# Patient Record
Sex: FEMALE | Race: WHITE
[De-identification: names, ages, dates, MRNs, and addresses within clinical notes are randomized per-mention and may not be internally consistent; named-entity substitution may affect disease eponyms.]

---

## 2017-01-09 ENCOUNTER — HOSPITAL ENCOUNTER (OUTPATIENT)
Dept: HOSPITAL 45 - C.LAB1850 | Age: 82
Discharge: HOME | End: 2017-01-09
Attending: INTERNAL MEDICINE
Payer: COMMERCIAL

## 2017-01-09 DIAGNOSIS — N18.3: ICD-10-CM

## 2017-01-09 DIAGNOSIS — E78.00: Primary | ICD-10-CM

## 2017-01-09 LAB
ALP SERPL-CCNC: 55 U/L (ref 45–117)
ALT SERPL-CCNC: 22 U/L (ref 12–78)
ANION GAP SERPL CALC-SCNC: 8 MMOL/L (ref 3–11)
APPEARANCE UR: CLEAR
AST SERPL-CCNC: 15 U/L (ref 15–37)
BILIRUB UR-MCNC: (no result) MG/DL
BUN SERPL-MCNC: 12 MG/DL (ref 7–18)
BUN/CREAT SERPL: 11.1 (ref 10–20)
CALCIUM SERPL-MCNC: 9 MG/DL (ref 8.5–10.1)
CHLORIDE SERPL-SCNC: 105 MMOL/L (ref 98–107)
CHOLEST/HDLC SERPL: 1.7 {RATIO}
CO2 SERPL-SCNC: 26 MMOL/L (ref 21–32)
COLOR UR: YELLOW
CREAT SERPL-MCNC: 1.1 MG/DL (ref 0.6–1.2)
EOSINOPHIL NFR BLD AUTO: 316 K/UL (ref 130–400)
GLUCOSE SERPL-MCNC: 90 MG/DL (ref 70–99)
GLUCOSE UR QL: 127 MG/DL
HCT VFR BLD CALC: 39.5 % (ref 37–47)
KETONES UR QL STRIP: 63 MG/DL
MANUAL MICROSCOPIC REQUIRED?: NO
MCH RBC QN AUTO: 30.7 PG (ref 25–34)
MCHC RBC AUTO-ENTMCNC: 34.7 G/DL (ref 32–36)
MCV RBC AUTO: 88.6 FL (ref 80–100)
NITRITE UR QL STRIP: (no result)
NITRITE UR QL STRIP: 154 MG/DL (ref 0–150)
PH UR STRIP: 5 [PH] (ref 4.5–7.5)
PH UR: 221 MG/DL (ref 0–200)
PHOSPHATE SERPL-MCNC: 3.8 MG/DL (ref 2.5–4.9)
PMV BLD AUTO: 9.8 FL (ref 7.4–10.4)
POTASSIUM SERPL-SCNC: 4.5 MMOL/L (ref 3.5–5.1)
RBC # BLD AUTO: 4.46 M/UL (ref 4.2–5.4)
REVIEW REQ?: NO
SODIUM SERPL-SCNC: 139 MMOL/L (ref 136–145)
SP GR UR STRIP: 1.01 (ref 1–1.03)
URINE BILL WITH OR WITHOUT MIC: (no result)
UROBILINOGEN UR-MCNC: (no result) MG/DL
VERY LOW DENSITY LIPOPROT CALC: 31 MG/DL
WBC # BLD AUTO: 7.89 K/UL (ref 4.8–10.8)

## 2017-03-09 ENCOUNTER — HOSPITAL ENCOUNTER (OUTPATIENT)
Dept: HOSPITAL 45 - C.MAMM | Age: 82
Discharge: HOME | End: 2017-03-09
Attending: FAMILY MEDICINE
Payer: COMMERCIAL

## 2017-03-09 DIAGNOSIS — Z12.31: Primary | ICD-10-CM

## 2017-03-09 NOTE — MAMMOGRAPHY REPORT
UNILATERAL LEFT DIGITAL SCREENING MAMMOGRAM TOMOSYNTHESIS WITH CAD: 3/9/2017

CLINICAL HISTORY: Routine screening.  Patient has no complaints.  





TECHNIQUE:  Breast tomosynthesis in addition to standard 2D mammography was performed. Current study
 was also evaluated with a Computer Aided Detection (CAD) system.  Left CC and MLO 2-D and tomosynth
esis images were obtained.



COMPARISON: Comparison is made to exams dated:  3/7/2016 mammogram, 3/4/2015 mammogram, 3/10/2015 ma
mmogram, 3/3/2014 mammogram, 3/1/2013 mammogram, and 2/28/2012 mammogram - [facility].   



BREAST COMPOSITION:  There are scattered areas of fibroglandular density in the left breast.  



FINDINGS:  No suspicious masses, calcifications, or areas of architectural distortion are noted in t
he left breast.  There has been no significant interval change compared to prior exams.  Scattered b
enign-appearing left breast calcifications are not significantly changed.





IMPRESSION:  ACR BI-RADS CATEGORY 2: BENIGN

There is no mammographic evidence of malignancy. A 1 year screening mammogram is recommended.  The p
atient will receive written notification of the results.  





Approximately 10% of breast cancers are not detected with mammography. A negative mammographic repor
t should not delay biopsy if a clinically suggestive mass is present.



Renetta Denson M.D.          

/:3/9/2017 12:39:33  



Imaging Technologist: Nat VILLARREAL)(RYLAN), [facility]

letter sent: Normal 1/2  

BI-RADS Code: ACR BI-RADS Category 2: Benign

## 2017-04-09 ENCOUNTER — HOSPITAL ENCOUNTER (EMERGENCY)
Dept: HOSPITAL 45 - C.EDB | Age: 82
Discharge: HOME | End: 2017-04-09
Payer: COMMERCIAL

## 2017-04-09 VITALS — HEART RATE: 68 BPM | OXYGEN SATURATION: 97 % | DIASTOLIC BLOOD PRESSURE: 77 MMHG | SYSTOLIC BLOOD PRESSURE: 180 MMHG

## 2017-04-09 VITALS
BODY MASS INDEX: 23.02 KG/M2 | BODY MASS INDEX: 23.02 KG/M2 | WEIGHT: 114.2 LBS | HEIGHT: 59.02 IN | WEIGHT: 114.2 LBS | HEIGHT: 59.02 IN

## 2017-04-09 VITALS — TEMPERATURE: 97.34 F

## 2017-04-09 DIAGNOSIS — L55.9: Primary | ICD-10-CM

## 2017-04-09 DIAGNOSIS — Z82.49: ICD-10-CM

## 2017-04-09 DIAGNOSIS — Z83.3: ICD-10-CM

## 2017-04-09 DIAGNOSIS — I10: ICD-10-CM

## 2017-04-09 DIAGNOSIS — I48.91: ICD-10-CM

## 2017-04-09 DIAGNOSIS — Z79.82: ICD-10-CM

## 2017-04-09 DIAGNOSIS — S69.91XA: ICD-10-CM

## 2017-04-09 DIAGNOSIS — E78.5: ICD-10-CM
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
## 2017-04-09 LAB
ALP SERPL-CCNC: 55 U/L (ref 45–117)
ALT SERPL-CCNC: 21 U/L (ref 12–78)
ANION GAP SERPL CALC-SCNC: 10 MMOL/L (ref 3–11)
AST SERPL-CCNC: 13 U/L (ref 15–37)
BASOPHILS # BLD: 0.01 K/UL (ref 0–0.2)
BASOPHILS NFR BLD: 0.1 %
BUN SERPL-MCNC: 20 MG/DL (ref 7–18)
BUN/CREAT SERPL: 16.4 (ref 10–20)
CALCIUM SERPL-MCNC: 8.7 MG/DL (ref 8.5–10.1)
CHLORIDE SERPL-SCNC: 110 MMOL/L (ref 98–107)
CO2 SERPL-SCNC: 24 MMOL/L (ref 21–32)
COMPLETE: YES
CREAT CL PREDICTED SERPL C-G-VRATE: 25.9 ML/MIN
CREAT SERPL-MCNC: 1.2 MG/DL (ref 0.6–1.2)
EOSINOPHIL NFR BLD AUTO: 278 K/UL (ref 130–400)
GLUCOSE SERPL-MCNC: 91 MG/DL (ref 70–99)
HCT VFR BLD CALC: 36 % (ref 37–47)
IG%: 0.1 %
IMM GRANULOCYTES NFR BLD AUTO: 44.4 %
INR PPP: 1.1 (ref 0.9–1.1)
LYMPHOCYTES # BLD: 3.14 K/UL (ref 1.2–3.4)
MCH RBC QN AUTO: 29.4 PG (ref 25–34)
MCHC RBC AUTO-ENTMCNC: 33.9 G/DL (ref 32–36)
MCV RBC AUTO: 86.7 FL (ref 80–100)
MONOCYTES NFR BLD: 5.9 %
NEUTROPHILS # BLD AUTO: 0.8 %
NEUTROPHILS NFR BLD AUTO: 48.7 %
PARTIAL THROMBOPLASTIN RATIO: 0.9
PMV BLD AUTO: 9.4 FL (ref 7.4–10.4)
POTASSIUM SERPL-SCNC: 4.4 MMOL/L (ref 3.5–5.1)
PROTHROMBIN TIME: 11.3 SECONDS (ref 9–12)
RBC # BLD AUTO: 4.15 M/UL (ref 4.2–5.4)
SODIUM SERPL-SCNC: 144 MMOL/L (ref 136–145)
WBC # BLD AUTO: 7.07 K/UL (ref 4.8–10.8)

## 2017-04-09 NOTE — EMERGENCY ROOM VISIT NOTE
History


Report prepared by Samantha:  Deon Rosario


Under the Supervision of:  Dr. Odell Sanchez M.D.


First contact with patient:  17:06


Chief Complaint:  SUN BURN


Stated Complaint:  SUN BURN HANDS AND NOSE





History of Present Illness


The patient is a 81 year old female who presents to the Emergency Room with 

concerns over a bruise on the back of her right hand that she noticed today. 

The patient states that she was digging weeds on Friday, two days prior to 

arrival when she may have bumped the hand to cause the bruise. She got very 

sunburnt then and was concerned that the bruise on the back of her right hand 

was something secondary to the sunburn. She denies any recent rectal bleeding, 

melena, nose bleeds, or headaches. She is on daily Baby Aspirin but otherwise 

is not on any anticoagulants.  She denies any easy bruising anywhere else.  She 

has had no chest pain or shortness of breath or any other concerning symptoms.





   Source of History:  patient


   Onset:  Today


   Position:  hand (right)


   Quality:  other (Bruise)


   Timing:  constant


   Associated Symptoms:  No headache





Review of Systems


See HPI for pertinent positives & negatives. A total of 10 systems reviewed and 

were otherwise negative.





Past Medical & Surgical


Medical Problems:


(1) Atrial Fibrillation


(2) Breast cancer


(3) Esophageal Reflux


(4) Hyperlipidemia Nec/Nos


(5) Hypertension


(6) Hypertension Nos








Family History





Cancer


Diabetes mellitus


Gallbladder disease


Hypertension


Lung disease





Social History


Smoking Status:  Never Smoker


Alcohol Use:  none


Marital Status:  


Housing Status:  lives with significant other


Occupation Status:  retired





Current/Historical Medications


Scheduled


Aspirin (Aspirin Ec), 81 MG PO DAILY


Chlorthalidone (Hygroton), 25 MG PO DAILY


Diltiazem Hcl Extended Release (Tiazac 360 Mg), 360 MG PO DAILY


Doxycycline (Monohydrate) (Monodox), 100 MG PO BID


Hydralazine Hcl (Apresoline), 25 MG PO TID


Lisinopril (Lisinopril), 40 MG PO DAILY


Simvastatin (Zocor), 20 MG PO Q2D





Allergies


Coded Allergies:  


     Penicillins (Verified  Allergy, Intermediate, swelling, 5/8/15)





Physical Exam


Vital Signs











  Date Time  Temp Pulse Resp B/P Pulse Ox O2 Delivery O2 Flow Rate FiO2


 


4/9/17 18:10  68 18 180/77 97   


 


4/9/17 16:54     98 Room Air  


 


4/9/17 16:37 36.3 63 16 173/77 98 Room Air  











Physical Exam


Constitutional: Vital signs reviewed.


Eyes: Pupils are equal round reactive to light.  Conjunctiva are noninjected.  


ENT: Pharynx is clear without erythema or exudate.  Mucous membranes are moist.

  Neck supple without meningeal signs.


Respiratory: Clear to auscultation bilaterally.  Breath sounds are equal 

bilaterally. 


Cardiovascular: Regular rate and rhythm.  No rubs or gallops.


GI: Soft, nondistended and nontender.  Bowel sounds are present.


Musculoskeletal: No lower extremity tenderness.  No bruising to the lower 

extremities.  There is a sunburn to the scalp, face, back of both hands with 

localized edema. There is a bruise to the dorsum of the right hand with no 

significant tenderness, no swelling to the right arm. 


Integumentary: See above. 


Neurological: The patient is awake and alert.  No focal deficits.


Psychiatric: Normal affect.





Medical Decision & Procedures


ER Provider


Diagnostic Interpretation:


Radiology results as stated below per my review and the radiologist's 

interpretation:





RIGHT HAND 3 VIEWS





HISTORY:      Right hand pain.





COMPARISON: None.





FINDINGS: There is no fracture or dislocation. Chondrocalcinosis within the


wrist. Moderate degenerative changes seen within the right hand and wrist. Soft


tissue swelling within the thumb and index finger. No radiopaque foreign bodies.





IMPRESSION:  





1. No definite fractures within the right hand.


2. Moderate degenerative changes and chondrocalcinosis.


3. Soft tissue swelling within the thumb and index finger.











Electronically signed by:  Ciro Rojo M.D.


4/9/2017 5:47 PM





Dictated Date/Time:  4/9/2017 5:43 PM





Laboratory Results


4/9/17 17:25








Red Blood Count 4.15, Mean Corpuscular Volume 86.7, Mean Corpuscular Hemoglobin 

29.4, Mean Corpuscular Hemoglobin Concent 33.9, Mean Platelet Volume 9.4, 

Neutrophils (%) (Auto) 48.7, Lymphocytes (%) (Auto) 44.4, Monocytes (%) (Auto) 

5.9, Eosinophils (%) (Auto) 0.8, Basophils (%) (Auto) 0.1, Neutrophils # (Auto) 

3.43, Lymphocytes # (Auto) 3.14, Monocytes # (Auto) 0.42, Eosinophils # (Auto) 

0.06, Basophils # (Auto) 0.01





4/9/17 17:25

















Test


  4/9/17


17:25


 


White Blood Count


  7.07 K/uL


(4.8-10.8)


 


Red Blood Count


  4.15 M/uL


(4.2-5.4)


 


Hemoglobin


  12.2 g/dL


(12.0-16.0)


 


Hematocrit 36.0 % (37-47) 


 


Mean Corpuscular Volume


  86.7 fL


()


 


Mean Corpuscular Hemoglobin


  29.4 pg


(25-34)


 


Mean Corpuscular Hemoglobin


Concent 33.9 g/dl


(32-36)


 


Platelet Count


  278 K/uL


(130-400)


 


Mean Platelet Volume


  9.4 fL


(7.4-10.4)


 


Neutrophils (%) (Auto) 48.7 % 


 


Lymphocytes (%) (Auto) 44.4 % 


 


Monocytes (%) (Auto) 5.9 % 


 


Eosinophils (%) (Auto) 0.8 % 


 


Basophils (%) (Auto) 0.1 % 


 


Neutrophils # (Auto)


  3.43 K/uL


(1.4-6.5)


 


Lymphocytes # (Auto)


  3.14 K/uL


(1.2-3.4)


 


Monocytes # (Auto)


  0.42 K/uL


(0.11-0.59)


 


Eosinophils # (Auto)


  0.06 K/uL


(0-0.5)


 


Basophils # (Auto)


  0.01 K/uL


(0-0.2)


 


RDW Standard Deviation


  42.9 fL


(36.4-46.3)


 


RDW Coefficient of Variation


  13.4 %


(11.5-14.5)


 


Immature Granulocyte % (Auto) 0.1 % 


 


Immature Granulocyte # (Auto)


  0.01 K/uL


(0.00-0.02)


 


Prothrombin Time


  11.3 SECONDS


(9.0-12.0)


 


Prothromb Time International


Ratio 1.1 (0.9-1.1) 


 


 


Activated Partial


Thromboplast Time 24.5 SECONDS


(21.0-31.0)


 


Partial Thromboplastin Ratio 0.9 


 


Anion Gap


  10.0 mmol/L


(3-11)


 


Est Creatinine Clear Calc


Drug Dose 25.9 ml/min 


 


 


Estimated GFR (


American) 49.8 


 


 


Estimated GFR (Non-


American 43.0 


 


 


BUN/Creatinine Ratio 16.4 (10-20) 


 


Calcium Level


  8.7 mg/dl


(8.5-10.1)


 


Total Bilirubin


  0.6 mg/dl


(0.2-1)


 


Direct Bilirubin


  0.2 mg/dl


(0-0.2)


 


Aspartate Amino Transf


(AST/SGOT) 13 U/L (15-37) 


 


 


Alanine Aminotransferase


(ALT/SGPT) 21 U/L (12-78) 


 


 


Alkaline Phosphatase


  55 U/L


()


 


Total Protein


  6.8 gm/dl


(6.4-8.2)


 


Albumin


  4.0 gm/dl


(3.4-5.0)





Laboratory results as reviewed by me.





ED Course


1709: The patient was evaluated in room D5. A complete history and physical 

exam was performed.





1801: Upon reevaluation, the patient was resting in bed. I discussed tonight's 

findings with her. She verbalized agreement of the treatment plan. The patient 

was discharged home.





Medical Decision


This is a 81-year-old female who presents with a bruise to her right hand and 

sunburn.  Differential diagnoses considered include coagulopathy, 

thrombocytopenia, fracture, bruise.  I did perform a limited focused review of 

portions of the patient's old chart on the electronic medical record. The 

patient had a normal CBC in January. 





I did evaluate the patient as noted above.  The patient presents with simple 

sunburn.  She is presenting here because she is concerned about a bruise to the 

back of her right hand.  She has no other symptoms.  There is no evidence of 

DVT.  She has had no bleeding anywhere else for easy bruising.  I did order and 

personally review the patient's hand x-rays as described above.  I did order 

and review the patient's blood work as noted in the electronic medical record.  

Platelet count, LFTs and coagulation studies are unremarkable.  I did discuss 

the test results with the patient.  I did recommend follow with her doctor.  

She was discharged in good condition.





Impression





 Primary Impression:  


 Sunburn


 Additional Impression:  


 Injury of right hand





Scribe Attestation


The scribe's documentation has been prepared under my direct and personally 

reviewed by me in its entirety. I confirm that the note above accurately 

reflects all work, treatment, procedures, and medical decision making performed 

by me.





Departure Information


Dispostion


Home / Self-Care





Referrals


Hellen Lerma DO (PCP)





Forms


HOME CARE DOCUMENTATION FORM,                                                 

               IMPORTANT VISIT INFORMATION, WORK / SCHOOL INSTRUCTIONS





Patient Instructions


My [facility]





Additional Instructions





You have been examined and treated today on an emergency basis only. This is 

not a substitute for, or an effort to provide, complete comprehensive medical 

care. It is impossible to recognize and treat all injuries or illnesses in a 

single emergency department visit. It is therefore important that you follow up 

closely with your physician.  Call as soon as possible for an appointment.  

Return for worsening symptoms or if you develop nosebleeds, headaches, rectal 

bleeding or any other concerning symptoms.  Remember to apply sunscreen to your 

scalp.





Problem Qualifiers








 Additional Impression:  


 Injury of right hand


 Encounter type:  initial encounter  Qualified Codes:  S69.91XA - Unspecified 

injury of right wrist, hand and finger(s), initial encounter

## 2017-04-09 NOTE — DIAGNOSTIC IMAGING REPORT
RIGHT HAND 3 VIEWS



HISTORY:      Right hand pain.



COMPARISON: None.



FINDINGS: There is no fracture or dislocation. Chondrocalcinosis within the

wrist. Moderate degenerative changes seen within the right hand and wrist. Soft

tissue swelling within the thumb and index finger. No radiopaque foreign bodies.



IMPRESSION:  



1. No definite fractures within the right hand.

2. Moderate degenerative changes and chondrocalcinosis.

3. Soft tissue swelling within the thumb and index finger.







Electronically signed by:  Ciro Rojo M.D.

4/9/2017 5:47 PM



Dictated Date/Time:  4/9/2017 5:43 PM

## 2017-04-24 ENCOUNTER — HOSPITAL ENCOUNTER (EMERGENCY)
Dept: HOSPITAL 45 - C.EDB | Age: 82
Discharge: HOME | End: 2017-04-24
Payer: COMMERCIAL

## 2017-04-24 VITALS
HEIGHT: 59.49 IN | BODY MASS INDEX: 22.46 KG/M2 | WEIGHT: 112.88 LBS | WEIGHT: 112.88 LBS | HEIGHT: 59.49 IN | BODY MASS INDEX: 22.46 KG/M2

## 2017-04-24 VITALS
HEART RATE: 68 BPM | TEMPERATURE: 97.52 F | OXYGEN SATURATION: 100 % | DIASTOLIC BLOOD PRESSURE: 86 MMHG | SYSTOLIC BLOOD PRESSURE: 186 MMHG

## 2017-04-24 DIAGNOSIS — Z79.82: ICD-10-CM

## 2017-04-24 DIAGNOSIS — Z85.3: ICD-10-CM

## 2017-04-24 DIAGNOSIS — I48.91: ICD-10-CM

## 2017-04-24 DIAGNOSIS — I10: ICD-10-CM

## 2017-04-24 DIAGNOSIS — E78.5: ICD-10-CM

## 2017-04-24 DIAGNOSIS — Z83.3: ICD-10-CM

## 2017-04-24 DIAGNOSIS — Z83.79: ICD-10-CM

## 2017-04-24 DIAGNOSIS — Z87.891: ICD-10-CM

## 2017-04-24 DIAGNOSIS — Z79.899: ICD-10-CM

## 2017-04-24 DIAGNOSIS — Z82.49: ICD-10-CM

## 2017-04-24 DIAGNOSIS — K21.9: ICD-10-CM
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
## 2017-04-24 DIAGNOSIS — Z83.6: ICD-10-CM

## 2017-04-24 DIAGNOSIS — S20.462A: Primary | ICD-10-CM

## 2017-04-24 NOTE — EMERGENCY ROOM VISIT NOTE
History


First contact with patient:  07:18


Chief Complaint:  BITE


Stated Complaint:  TICK





History of Present Illness


The patient is a 81 year old female who presents to the Emergency Room 

requesting tick removal from her back.  The patient reports that she noticed 

the tick this morning.  She attempted to remove the tick unsuccessfully, and is 

here requesting further prevention.  She is uncertain how long the tick may 

have been attached.  This is now her third tick bite this year.  She denies any 

history of Lyme's disease.  Denies any pain.  Tetanus immunization is up-to-

date.





Review of Systems


6 system review was performed and was negative except for pertinent positives 

and negatives as indicated in history of present illness





Past Medical/Surgical History


Medical Problems:


(1) Atrial Fibrillation


(2) Breast cancer


(3) Esophageal Reflux


(4) Hyperlipidemia Nec/Nos


(5) Hypertension


(6) Hypertension Nos








Family History





Cancer


Diabetes mellitus


Gallbladder disease


Hypertension


Lung disease





Social History


Smoking Status:  Former Smoker


Alcohol Use:  none


Marital Status:  


Housing Status:  lives with significant other


Occupation Status:  retired





Current/Historical Medications


Scheduled


Aspirin (Aspirin Ec), 81 MG PO DAILY


Chlorthalidone (Hygroton), 25 MG PO DAILY


Diltiazem Hcl Extended Release (Tiazac 360 Mg), 360 MG PO DAILY


Doxycycline (Monohydrate) (Monodox), 100 MG PO BID


Hydralazine Hcl (Apresoline), 25 MG PO TID


Lisinopril (Lisinopril), 40 MG PO DAILY


Simvastatin (Zocor), 20 MG PO Q2D





Allergies


Coded Allergies:  


     Penicillins (Verified  Allergy, Intermediate, swelling, 5/8/15)





Physical Exam


Vital Signs











  Date Time  Temp Pulse Resp B/P Pulse Ox O2 Delivery O2 Flow Rate FiO2


 


4/24/17 07:15 36.4 68 18 186/86 100 Room Air  











Physical Exam


CONSTITUTIONAL:  Healthy and well nourished.  Alert and oriented X 3 with 

positive affect.


HEENT:  Normocephalic, atraumatic.  Pupils equal, round and reactive.  


NECK:  Full active range of motion without discomfort.


RESPIRATORY:  Clear to auscultation bilaterally with no wheezing, crackles, 

rhonchi or stridor.


CARDIOVASCULAR:  Regular rate and rhythm with no murmurs, rubs or gallops.


INTEGUMENTARY: Examination shows a tick embedded within the left mid thoracic 

back region.  There is a notable peripheral erythema and venous lake.  It is 

nontender to palpation.


NEUROLOGIC:  No focal neurologic deficits noted.





Medical Decision & Procedures


Procedure


Tick removal was performed under local anesthesia at the patient's request.  

The area was painted with iodine and allowed to dry.  Using buffered 1% 

lidocaine, a local wheal was administered.  Using a 27-gauge needle, the 

remnant tick was then elevated and sharply excised using a #15 scalpel.  The 

wound was then further cleansed, and bacitracin Band-Aid was applied.





ED Course


Patient history and physical exam were performed.  Nurse's notes were reviewed.

  Vital signs were reviewed and normal.  Tick removal was performed under local 

anesthesia.  The patient was administered a prophylactic dose of doxycycline 

200 mg.  She was instructed to watch for any additional rash consistent with 

erythema migrans.  Follow-up with family doctor as needed.  The patient was 

happy with plan of care, and denied any pain at the time of discharge.





The patient was also seen and evaluated by Dr. Grubbs, ED attending physician, 

who agrees with workup and plan of care.





Medical Decision








Impression





 Primary Impression:  


 Tick bite of left upper back excluding scapular region





Departure Information


Dispostion


Home / Self-Care





Forms


HOME CARE DOCUMENTATION FORM,                                                 

               IMPORTANT VISIT INFORMATION





Patient Instructions


My [facility]





Additional Instructions





Keep wound clean and covered with an antibiotic ointment and Band-Aid until it 

heals.


Watch for any worsening redness, swelling, pain or fever.


You have been administered doxycycline 200 mg, which should negate your risk 

for Lyme's disease.


Follow-up with your family doctor if you develop a "bullseye rash".





Problem Qualifiers








 Primary Impression:  


 Tick bite of left upper back excluding scapular region


 Encounter type:  initial encounter  Qualified Codes:  S20.462A - Insect bite (

nonvenomous) of left back wall of thorax, initial encounter; W57.XXXA - Bitten 

or stung by nonvenomous insect and other nonvenomous arthropods, initial 

encounter

## 2017-04-24 NOTE — EMERGENCY ROOM VISIT NOTE
ED Visit Note


First contact with patient:  07:18


81-year-old female with a tick bite on her left back was fully evaluated by 

Luis Felipe Bran PA-C.  Please see his note.  I also independently evaluated the 

patient.  The patient will be placed on doxycycline.





IMPRESSION: Tick Bite

## 2017-07-13 ENCOUNTER — HOSPITAL ENCOUNTER (OUTPATIENT)
Dept: HOSPITAL 45 - C.LAB1850 | Age: 82
Discharge: HOME | End: 2017-07-13
Attending: INTERNAL MEDICINE
Payer: COMMERCIAL

## 2017-07-13 DIAGNOSIS — N18.3: Primary | ICD-10-CM

## 2017-07-13 LAB
ANION GAP SERPL CALC-SCNC: 10 MMOL/L (ref 3–11)
APPEARANCE UR: CLEAR
BILIRUB UR-MCNC: (no result) MG/DL
BUN SERPL-MCNC: 16 MG/DL (ref 7–18)
BUN/CREAT SERPL: 13.3 (ref 10–20)
CALCIUM SERPL-MCNC: 9.5 MG/DL (ref 8.5–10.1)
CHLORIDE SERPL-SCNC: 105 MMOL/L (ref 98–107)
CO2 SERPL-SCNC: 22 MMOL/L (ref 21–32)
COLOR UR: YELLOW
CREAT SERPL-MCNC: 1.2 MG/DL (ref 0.6–1.2)
GLUCOSE SERPL-MCNC: 93 MG/DL (ref 70–99)
MANUAL MICROSCOPIC REQUIRED?: NO
NITRITE UR QL STRIP: (no result)
PH UR STRIP: 5 [PH] (ref 4.5–7.5)
PHOSPHATE SERPL-MCNC: 3.5 MG/DL (ref 2.5–4.9)
POTASSIUM SERPL-SCNC: 4.2 MMOL/L (ref 3.5–5.1)
REVIEW REQ?: NO
SODIUM SERPL-SCNC: 137 MMOL/L (ref 136–145)
SP GR UR STRIP: 1.01 (ref 1–1.03)
URINE BILL WITH OR WITHOUT MIC: (no result)
UROBILINOGEN UR-MCNC: (no result) MG/DL

## 2017-07-20 ENCOUNTER — HOSPITAL ENCOUNTER (OUTPATIENT)
Dept: HOSPITAL 45 - C.CTS | Age: 82
Discharge: HOME | End: 2017-07-20
Attending: PHYSICIAN ASSISTANT
Payer: COMMERCIAL

## 2017-07-20 DIAGNOSIS — I77.1: Primary | ICD-10-CM

## 2017-07-20 DIAGNOSIS — I67.2: ICD-10-CM

## 2017-07-20 NOTE — DIAGNOSTIC IMAGING REPORT
ANGIOGRAPHY NECK COMBO



HISTORY:  81 years Female CAROTID STENOSIS. The patient has history of 80%

stenosis of the left internal carotid artery and 80-90% stenosis of the right

internal carotid artery. History of prior left-sided carotid endarterectomy.

History of breast cancer.



COMPARISON: CTA of the neck 10/16/2014



TECHNIQUE: CTA of the neck was obtained following the intravenous administration

of 118 mL Optiray 320. 3-D coronal and sagittal MIPS were obtained from the

axial data set and submitted for review. A dose lowering technique was used

consistent with the principles of ALARA. Measurements were made by NASCET

criteria.



FINDINGS:

 

CTA:

 Noncontrast scan demonstrates no evidence of intramural hematoma. There is

moderate to extensive atherosclerotic plaquing of the thoracic aortic arch and

bilateral carotid arteries, right greater than left.



There is focal kinking of the right subclavian artery at the level of the right

first rib as seen on image 87 of the axial series causing 50% stenosis. This

appears unchanged. Additionally, there is a large plaque at the origin of the

right subclavian artery causing approximately 70% narrowing.



Atherosclerotic plaquing at the origin of the left common carotid artery is

again noted causing approximately 50% narrowing. Bilateral common carotid

arteries are otherwise patent with scattered areas of plaque noted. Prior left

carotid endarterectomy without evidence of high-grade stenosis by NASCET

criteria. Atherosclerotic plaquing is also seen at the clinoid portion of the

left internal carotid artery without high-grade narrowing.



Extensive atherosclerotic plaque at the origin of the right internal carotid

artery is again seen, most pronounced on image 208 and 214 of the axial series

with stenosis of approximately 90%. The remaining right carotid vasculature is

patent with atherosclerotic plaque infraclinoid portion. These findings appear

generally stable from comparison study. Large calcified plaque of the right ICA

measures up to 2.4 cm in length.



The left vertebral artery is dominant. Atherosclerotic plaquing at the origin of

the right vertebral artery causes approximately 70% stenosis (see images 95

through 98 of the axial series). The basilar artery is patent and appears

normal.



No dissection or aneurysm is seen.



CT NECK:

Centrilobular emphysematous changes are noted within the imaged lung apices.

There are a few scattered groundglass opacities of the right lung apex measuring

up to 8 mm which are only partially imaged. Pleural parenchymal scarring is seen

within the right lung apex. Subcentimeter nodules are seen throughout the

thyroid. Airways patent. There is mild sphenoid and ethmoid sinus disease.

Multilevel advanced degenerative changes are seen throughout the spine.



IMPRESSION: 

1. Prior left carotid endarterectomy without evidence of high-grade left ICA

stenosis.

2. Approximately 90% narrowing of the proximal right internal carotid artery

secondary to large atherosclerotic plaque.

3. Atherosclerotic plaquing at the origin of the right vertebral artery causes

approximately 70% narrowing.

4. Additional areas of narrowing are seen within the right subclavian artery

which appear unchanged from comparison study dated 10/16/2014.

5. Incompletely imaged scattered groundglass opacities in the right upper lobe

could be further evaluated with CT of the chest. 







The above report was generated using voice recognition software. It may contain

grammatical, syntax or spelling errors.







Electronically signed by:  Francisco Charles M.D.

7/20/2017 10:43 AM



Dictated Date/Time:  7/20/2017 10:22 AM

## 2017-08-11 LAB
ANION GAP SERPL CALC-SCNC: 8 MMOL/L (ref 3–11)
BASOPHILS # BLD: 0.01 K/UL (ref 0–0.2)
BASOPHILS NFR BLD: 0.2 %
BUN SERPL-MCNC: 17 MG/DL (ref 7–18)
BUN/CREAT SERPL: 12.7 (ref 10–20)
CALCIUM SERPL-MCNC: 9 MG/DL (ref 8.5–10.1)
CHLORIDE SERPL-SCNC: 107 MMOL/L (ref 98–107)
CO2 SERPL-SCNC: 24 MMOL/L (ref 21–32)
COMPLETE: YES
CREAT CL PREDICTED SERPL C-G-VRATE: 24.6 ML/MIN
CREAT SERPL-MCNC: 1.3 MG/DL (ref 0.6–1.2)
EOSINOPHIL NFR BLD AUTO: 284 K/UL (ref 130–400)
GLUCOSE SERPL-MCNC: 91 MG/DL (ref 70–99)
HCT VFR BLD CALC: 34.8 % (ref 37–47)
IG%: 0.3 %
IMM GRANULOCYTES NFR BLD AUTO: 42.4 %
INR PPP: 1 (ref 0.9–1.1)
LYMPHOCYTES # BLD: 2.71 K/UL (ref 1.2–3.4)
MCH RBC QN AUTO: 30.2 PG (ref 25–34)
MCHC RBC AUTO-ENTMCNC: 33.6 G/DL (ref 32–36)
MCV RBC AUTO: 89.9 FL (ref 80–100)
MONOCYTES NFR BLD: 5.5 %
NEUTROPHILS # BLD AUTO: 1.4 %
NEUTROPHILS NFR BLD AUTO: 50.2 %
PARTIAL THROMBOPLASTIN RATIO: 0.9
PMV BLD AUTO: 9.6 FL (ref 7.4–10.4)
POTASSIUM SERPL-SCNC: 4.2 MMOL/L (ref 3.5–5.1)
PROTHROMBIN TIME: 10.6 SECONDS (ref 9–12)
RBC # BLD AUTO: 3.87 M/UL (ref 4.2–5.4)
SODIUM SERPL-SCNC: 139 MMOL/L (ref 136–145)
WBC # BLD AUTO: 6.39 K/UL (ref 4.8–10.8)

## 2017-08-11 NOTE — DIAGNOSTIC IMAGING REPORT
CHEST PREADMISSION(PA/LAT)



CLINICAL HISTORY: Preoperative chest    



COMPARISON STUDY:  6/6/2014



FINDINGS: The cardiac and mediastinal contours are normal. There is no evidence

of focal pulmonary consolidation. There is no evidence of failure. No pleural

effusions are visualized.[ 



IMPRESSION: No active disease in the chest.







Electronically signed by:  Gallito Ribera M.D.

8/11/2017 12:08 PM



Dictated Date/Time:  8/11/2017 12:08 PM

## 2017-08-11 NOTE — PAT MEDICATION INSTRUCTIONS
Service Date


Aug 11, 2017.





Current Home Medication List


Aspirin (Aspirin Ec), 81 MG PO QAM


Diltiazem Hcl Extended Release (Tiazac 360 Mg), 360 MG PO QAM


Lisinopril (Lisinopril), 40 MG PO BID


Simvastatin (Zocor), 20 MG PO Q2D





Medication Instructions


For Your Scheduled Surgery 











Simvastatin (Zocor), 20 MG PO Q2D (continue as directed)








- Hold the following medications the morning of surgery:


Lisinopril (Lisinopril), 40 MG PO BID








- Take the following medications the morning of surgery with a sip of water:


Diltiazem Hcl Extended Release (Tiazac 360 Mg), 360 MG PO QAM


Aspirin (Aspirin Ec), 81 MG PO QAM (okay to continue per surgeon)








-  Hold  the following medications as scheduled the night before surgery:


Lisinopril (Lisinopril), 40 MG PO BID








If you have any questions please call us at [phone] or [phone] or 

[phone]

## 2017-08-29 ENCOUNTER — HOSPITAL ENCOUNTER (INPATIENT)
Dept: HOSPITAL 45 - C.ACU | Age: 82
LOS: 1 days | Discharge: HOME | DRG: 39 | End: 2017-08-30
Attending: SURGERY | Admitting: SURGERY
Payer: COMMERCIAL

## 2017-08-29 VITALS
HEART RATE: 45 BPM | DIASTOLIC BLOOD PRESSURE: 64 MMHG | OXYGEN SATURATION: 95 % | SYSTOLIC BLOOD PRESSURE: 144 MMHG | TEMPERATURE: 97.7 F

## 2017-08-29 VITALS — DIASTOLIC BLOOD PRESSURE: 39 MMHG | SYSTOLIC BLOOD PRESSURE: 118 MMHG | OXYGEN SATURATION: 100 % | HEART RATE: 39 BPM

## 2017-08-29 VITALS — HEART RATE: 44 BPM | DIASTOLIC BLOOD PRESSURE: 47 MMHG | OXYGEN SATURATION: 100 % | SYSTOLIC BLOOD PRESSURE: 142 MMHG

## 2017-08-29 VITALS — SYSTOLIC BLOOD PRESSURE: 80 MMHG | DIASTOLIC BLOOD PRESSURE: 48 MMHG | HEART RATE: 44 BPM | OXYGEN SATURATION: 100 %

## 2017-08-29 VITALS — DIASTOLIC BLOOD PRESSURE: 44 MMHG | HEART RATE: 52 BPM | OXYGEN SATURATION: 95 % | SYSTOLIC BLOOD PRESSURE: 128 MMHG

## 2017-08-29 VITALS — OXYGEN SATURATION: 94 % | HEART RATE: 46 BPM | SYSTOLIC BLOOD PRESSURE: 137 MMHG | DIASTOLIC BLOOD PRESSURE: 46 MMHG

## 2017-08-29 VITALS — SYSTOLIC BLOOD PRESSURE: 138 MMHG | HEART RATE: 47 BPM | OXYGEN SATURATION: 96 % | DIASTOLIC BLOOD PRESSURE: 39 MMHG

## 2017-08-29 VITALS — SYSTOLIC BLOOD PRESSURE: 125 MMHG | OXYGEN SATURATION: 100 % | DIASTOLIC BLOOD PRESSURE: 46 MMHG | HEART RATE: 41 BPM

## 2017-08-29 VITALS — DIASTOLIC BLOOD PRESSURE: 37 MMHG | SYSTOLIC BLOOD PRESSURE: 113 MMHG | OXYGEN SATURATION: 100 % | HEART RATE: 43 BPM

## 2017-08-29 VITALS
SYSTOLIC BLOOD PRESSURE: 110 MMHG | OXYGEN SATURATION: 95 % | TEMPERATURE: 97.7 F | HEART RATE: 43 BPM | DIASTOLIC BLOOD PRESSURE: 46 MMHG

## 2017-08-29 VITALS
OXYGEN SATURATION: 98 % | TEMPERATURE: 97.52 F | DIASTOLIC BLOOD PRESSURE: 84 MMHG | SYSTOLIC BLOOD PRESSURE: 164 MMHG | HEART RATE: 57 BPM

## 2017-08-29 VITALS
TEMPERATURE: 97.7 F | SYSTOLIC BLOOD PRESSURE: 104 MMHG | OXYGEN SATURATION: 100 % | DIASTOLIC BLOOD PRESSURE: 42 MMHG | HEART RATE: 46 BPM

## 2017-08-29 VITALS
HEIGHT: 58 IN | WEIGHT: 115.52 LBS | BODY MASS INDEX: 24.25 KG/M2 | WEIGHT: 115.52 LBS | BODY MASS INDEX: 24.25 KG/M2 | HEIGHT: 58 IN

## 2017-08-29 VITALS — SYSTOLIC BLOOD PRESSURE: 120 MMHG | HEART RATE: 46 BPM | DIASTOLIC BLOOD PRESSURE: 39 MMHG | OXYGEN SATURATION: 100 %

## 2017-08-29 VITALS — HEART RATE: 43 BPM | SYSTOLIC BLOOD PRESSURE: 112 MMHG | DIASTOLIC BLOOD PRESSURE: 38 MMHG | OXYGEN SATURATION: 99 %

## 2017-08-29 VITALS — DIASTOLIC BLOOD PRESSURE: 46 MMHG | OXYGEN SATURATION: 100 % | HEART RATE: 53 BPM | SYSTOLIC BLOOD PRESSURE: 129 MMHG

## 2017-08-29 VITALS — DIASTOLIC BLOOD PRESSURE: 40 MMHG | SYSTOLIC BLOOD PRESSURE: 117 MMHG | HEART RATE: 42 BPM | OXYGEN SATURATION: 100 %

## 2017-08-29 VITALS — OXYGEN SATURATION: 99 % | DIASTOLIC BLOOD PRESSURE: 32 MMHG | HEART RATE: 49 BPM | SYSTOLIC BLOOD PRESSURE: 127 MMHG

## 2017-08-29 VITALS — HEART RATE: 37 BPM | DIASTOLIC BLOOD PRESSURE: 42 MMHG | OXYGEN SATURATION: 100 % | SYSTOLIC BLOOD PRESSURE: 133 MMHG

## 2017-08-29 VITALS — HEART RATE: 44 BPM | SYSTOLIC BLOOD PRESSURE: 118 MMHG | DIASTOLIC BLOOD PRESSURE: 40 MMHG | OXYGEN SATURATION: 100 %

## 2017-08-29 VITALS — SYSTOLIC BLOOD PRESSURE: 123 MMHG | DIASTOLIC BLOOD PRESSURE: 39 MMHG | OXYGEN SATURATION: 97 % | HEART RATE: 36 BPM

## 2017-08-29 VITALS — DIASTOLIC BLOOD PRESSURE: 48 MMHG | HEART RATE: 50 BPM | OXYGEN SATURATION: 100 % | SYSTOLIC BLOOD PRESSURE: 129 MMHG

## 2017-08-29 VITALS
SYSTOLIC BLOOD PRESSURE: 121 MMHG | HEART RATE: 53 BPM | DIASTOLIC BLOOD PRESSURE: 49 MMHG | TEMPERATURE: 98.24 F | OXYGEN SATURATION: 95 %

## 2017-08-29 VITALS — SYSTOLIC BLOOD PRESSURE: 115 MMHG | OXYGEN SATURATION: 100 % | DIASTOLIC BLOOD PRESSURE: 37 MMHG | HEART RATE: 43 BPM

## 2017-08-29 VITALS — DIASTOLIC BLOOD PRESSURE: 39 MMHG | HEART RATE: 43 BPM | OXYGEN SATURATION: 100 % | SYSTOLIC BLOOD PRESSURE: 117 MMHG

## 2017-08-29 DIAGNOSIS — I12.9: ICD-10-CM

## 2017-08-29 DIAGNOSIS — N18.3: ICD-10-CM

## 2017-08-29 DIAGNOSIS — Z79.82: ICD-10-CM

## 2017-08-29 DIAGNOSIS — Z79.899: ICD-10-CM

## 2017-08-29 DIAGNOSIS — Z86.73: ICD-10-CM

## 2017-08-29 DIAGNOSIS — G24.9: ICD-10-CM

## 2017-08-29 DIAGNOSIS — I73.9: ICD-10-CM

## 2017-08-29 DIAGNOSIS — R51: ICD-10-CM

## 2017-08-29 DIAGNOSIS — I65.21: Primary | ICD-10-CM

## 2017-08-29 DIAGNOSIS — D64.9: ICD-10-CM

## 2017-08-29 DIAGNOSIS — E78.5: ICD-10-CM

## 2017-08-29 DIAGNOSIS — Z87.891: ICD-10-CM

## 2017-08-29 PROCEDURE — 03UK0JZ SUPPLEMENT RIGHT INTERNAL CAROTID ARTERY WITH SYNTHETIC SUBSTITUTE, OPEN APPROACH: ICD-10-PCS | Performed by: SURGERY

## 2017-08-29 PROCEDURE — 03CM0ZZ EXTIRPATION OF MATTER FROM RIGHT EXTERNAL CAROTID ARTERY, OPEN APPROACH: ICD-10-PCS | Performed by: SURGERY

## 2017-08-29 RX ADMIN — CLINDAMYCIN PHOSPHATE SCH MLS/HR: 600 INJECTION, SOLUTION INTRAVENOUS at 07:00

## 2017-08-29 RX ADMIN — ACETAMINOPHEN PRN MG: 325 TABLET ORAL at 17:36

## 2017-08-29 RX ADMIN — CLINDAMYCIN PHOSPHATE SCH MLS/HR: 600 INJECTION, SOLUTION INTRAVENOUS at 07:28

## 2017-08-29 RX ADMIN — LISINOPRIL SCH MG: 20 TABLET ORAL at 20:33

## 2017-08-29 RX ADMIN — DEXTROSE AND SODIUM CHLORIDE SCH MLS/HR: 5; .45 INJECTION, SOLUTION INTRAVENOUS at 22:11

## 2017-08-29 RX ADMIN — CLINDAMYCIN PHOSPHATE SCH MLS/HR: 150 INJECTION, SOLUTION INTRAMUSCULAR; INTRAVENOUS at 20:35

## 2017-08-29 RX ADMIN — DEXTROSE AND SODIUM CHLORIDE SCH MLS/HR: 5; .45 INJECTION, SOLUTION INTRAVENOUS at 12:16

## 2017-08-29 RX ADMIN — CLINDAMYCIN PHOSPHATE SCH MLS/HR: 150 INJECTION, SOLUTION INTRAMUSCULAR; INTRAVENOUS at 12:16

## 2017-08-29 NOTE — DIAGNOSTIC IMAGING REPORT
CT HEAD WITHOUT CONTRAST (CT)



CLINICAL HISTORY: Possible stroke status post carotid endarterectomy.    



COMPARISON STUDY:  10/16/2014



TECHNIQUE:  Axial CT of the brain is performed from the vertex to the skull

base. IV contrast was not administered for this examination. A dose lowering

technique was utilized adhering to the principles of ALARA.

 



CT DOSE: 537.48 mGy.cm



FINDINGS:



No intra or extra-axial mass lesions are visualized. There is no CT evidence of

acute cortical infarction. There is no evidence of midline shift. There is no

acute  hemorrhage. No calvarial fractures are visualized. 

There are moderate white matter hypodensities likely on a small vessel basis.

There is no evidence of pathologic ventricular dilatation.

There is no evidence of acute sinusitis



IMPRESSION: No acute intracranial findings







Electronically signed by:  Gallito Ribera M.D.

8/29/2017 8:27 PM



Dictated Date/Time:  8/29/2017 8:25 PM

## 2017-08-29 NOTE — DIAGNOSTIC IMAGING REPORT
ULTRASOUND OF THE CAROTID ARTERIES



CLINICAL HISTORY: Status post carotid endarterectomy. History of carotid

stenosis.    



COMPARISON STUDY: CT angiography the neck dated 7/20/2017



TECHNIQUE: Real-time, grayscale, and color Doppler sonography of the carotid

arteries was performed. Imaging reviewed in the transverse and longitudinal

planes. NASCET criteria was utilized for stenosis calcification.



FINDINGS:



There is mild atherosclerotic plaque present    . 

The peak systolic velocity within the right internal carotid artery is 58

cm/sec.

The systolic velocity ratio of right internal to common carotid artery is 1.

There is trace fluid surrounding the right common carotid artery, likely

postsurgical.

No flow is visualized within the proximal right external carotid artery. There

is reversed flow within the distal right external carotid artery.





The peak systolic velocity within the left internal carotid artery is 102

cm/sec.

The systolic velocity ratio left internal to common carotid artery is 1.0.



Antegrade flow is seen in the vertebral arteries.







IMPRESSION: 

1. No evidence of internal carotid artery stenosis

2. 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







Electronically signed by:  Gallito Ribera M.D.

8/29/2017 7:52 PM



Dictated Date/Time:  8/29/2017 7:48 PM

## 2017-08-29 NOTE — [facility] OPERATIVE REPORT
Operative Report


Operative Date


Aug 29, 2017.





Pre-Operative Diagnosis





Right Carotid Stenosis





Post-Operative Diagnosis





Right Carotid Stenosis





Procedure(s) Performed





Right Carotid Endarterectomy with patch





Surgeon


Dr. Childs





Assistant Surgeon(s)


Yeison Luna- Resident





Estimated Blood Loss


50ML





Findings


Patient had a heavily calcified plaque in the right common and internal carotid 

artery.





Specimens





A. Right Carotid Plaque





Anesthesia


Gen





Complication(s)


None





Disposition


Recovery Room / PACU





Indications


Mrs. Vogt  underwent a left carotid endarterectomy in October 2014, due to a 

symptomatic carotid disease.  The patient presents today for a followup.  She 

denies any complaints at this time including amaurosis, unilateral extremity 

weakness, numbness, or tingling, facial droop, difficulty speaking, or 

swallowing, sudden onset confusion or severe headaches or other complaints.  

She continues to be fully independent.  She did have an ultrasound prior to 

today's appointment, which demonstrates a severe stenosis of her right internal 

carotid artery, patent left carotid endarterectomy site with intimal thickening

, and antegrade flow in both vertebrals and no significant stenosis of her 

bilateral subclavian arteries.  In comparison with the previous ultrasound 

performed a year ago, the ICA/CCA ratio increased from 3.4 to 6.5 in the right 

carotid, and her peak systolic velocity increased to 364 with a diastolic 

velocity of 104.  This is indicative of a severe stenosis.  This was confirmed 

by CTA.





Description of Procedure


The patient was brought to the operating room, where an arterial line was 

placed and general anesthesia was secured. The right neck was prepped and 

sterilely draped. An oblique incision was made along the anterior border of the 

right sternocleidomastoid muscle.    The platysma was divided and dissection 

was carried down to the carotid sheath. The facial vein was doubly ligated and 

divided exposing the carotid bifurcation. The vagus nerve and XII nerve were 

identified and kept free from dissection and retraction. The internal, external

, and common carotid arteries were dissected free proximally and distally.  The 

inferior belly of the digastric was divided to obtain more exposure of the 

internal carotid artery.  6000 U of Heparin was given intravenously.  The 

external carotid as well as superior thyroid  vessels were encircled with 

vessel loops. Once the heparin was allowed to circulate for approximately 5 

minutes, clamps were placed starting with the internal carotid and common 

carotid and external carotid. An anterior arteriotomy was made on the common 

carotid artery using an 11 blade. Schmidt scissors was used to extend the 

arteriotomy through the plaque on to the distal soft internal carotid artery. 

The plaque was heavily calcified, ulcerated, and nearly occlusive.  A shunt was 

then inserted into the into the internal carotid artery and revealed good 

backbleeding.  The proximal end of the shunt was then inserted into the common 

carotid artery and secured in place.  The Doppler attached to the shunt was 

turned on and revealed good flow through the shunt. A Parker elevator was used 

to create an endarterectomy plane. The proximal endpoint was created using 

Schmidt scissors as well as a distal endpoint was created with the Schmidt 

scissors.  The plaque was freed out of the external carotid artery.With 

downward retraction on the plaque, a smooth distal endpoint was created. All 

debris was meticulously debrided from the inside of the lumen and confirmed 

with instillation of heparinized saline.  Once endarterectomy was completed, an 

Aquacel patch was then cut to the appropriate size and sewn into internal 

carotid artery using a PTFE 6-0 sutures starting at the internal carotid artery 

corner of the arteriotomy. Before the patch was completed, the shunt was pulled 

and all the arteries were backbleed extruding any air and debris. The patch was 

then completed. The external carotid clamp was removed first, followed by the 

common carotid artery clamp, and finally the internal carotid artery clamp, 

restoring blood flow to the brain.  Patient did have some oozing and a 6-0 

Prolene suture was used to achieve hemostasis as well as thrombin soaked gel 

foam.  Meticulous hemostasis was secured. The wound was then closed in multiple 

layers using 3-0 Vicryl suture then a 4-0 Vicryl subcutaneous layer closing the 

skin.  Dermabond was applied over the incision.  The patient tolerated the 

procedure well and was extubated on table. The patient was moving all four 

extremities to command prior to and upon transfer to the recovery room. 





I, Dr. Childs was present and scrubbed for the entire procedure.


Dr. Childs was present and scrubbed for the entirety of the case.


I attest to the content of the Intraoperative Record and any orders documented 

therein.  Any exceptions are noted below.

## 2017-08-29 NOTE — HISTORY AND PHYSICAL
History & Physical


Date of Service


Aug 29, 2017.





History & Physical


Allergies:  PENICILLIN.  





CC:  Severe right internal caroitd artery stenosis


   


HPI:   Mrs. Vogt  underwent a left carotid endarterectomy in 2014, 

due to a symptomatic carotid disease.  The patient presents today for a 

followup.  She denies any complaints at this time including amaurosis, 

unilateral extremity weakness, numbness, or tingling, facial droop, difficulty 

speaking, or swallowing, sudden onset confusion or severe headaches or other 

complaints.  She continues to be fully independent.  She did have an ultrasound 

prior to today's appointment, which demonstrates a severe stenosis of her right 

internal carotid artery, patent left carotid endarterectomy site with intimal 

thickening, and antegrade flow in both vertebrals and no significant stenosis 

of her bilateral subclavian arteries.  In comparison with the previous 

ultrasound performed a year ago, the ICA/CCA ratio increased from 3.4 to 6.5 in 

the right carotid, and her peak systolic velocity increased to 364 with a 

diastolic velocity of 104.  This is indicative of a severe stenosis.  This was 

confirmed by CTA.





Medications:  Reviewed.  No changes were made to her home medications.  





Past Medical History:  Positive for hypertension, cancer and stroke.  





Previous Surgeries:  Include tonsillectomy, , breast biopsies, stomach 

surgery, mastectomy and bowel surgery.  





Family History:  Positive for cancer.  





Social History:  She quit smoking in .  She does not drink.  





Review of Systems:  Ten-review of systems was answered.  Her only complaints 

were as the history of present illness.  





PHYSICAL EXAM:  Her vital signs are as follows:  Blood pressure 126/50 with a 

heart rate of 64, oxygen 98% on room air.  Constitutional:  In general, patient 

is a healthy-appearing for age, well-nourished, well-developed elderly female, 

in no acute distress.  She ambulates without assistance, and is active, alert, 

and oriented x4.  Her right carotid does not demonstrate significant bruit.  

Her left demonstrates a faint bruit.  Heart demonstrates a regular rate and 

rhythm.  Lungs are clear.  She has +3 brachial radial and femoral pulses.  Her 

lower extremity distal pulses are +1 PT and DP.  She has brisk capillary 

refill.  No sign of distal ischemia.





Imp:


   Right internal carotid artery stenosis





Plan:


   Patient is admitted for a right CEA.  I have discussed the risks options and 

benefits of the procedure with the patient.  The patient understands the risks 

options and benefits and agrees to the procedure.

## 2017-08-29 NOTE — ANESTHESIOLOGY PROGRESS NOTE
Anesthesia Post Op Note


Date & Time


Aug 29, 2017 at 11:17





Vital Signs


Pain Intensity:  0





Vital Signs Past 12 Hours








  Date Time  Temp Pulse Resp B/P (MAP) Pulse Ox O2 Delivery O2 Flow Rate FiO2


 


8/29/17 11:05  55 16 134/52 100 Oxymask 3 


 


8/29/17 10:55  56 16 134/58 100 Oxymask 5 


 


8/29/17 10:45  59 16 139/65 100 Oxymask 5 


 


8/29/17 10:35 36.0 62 16 152/69 100 Oxymask 10 


 


8/29/17 05:58 36.4 57 18 164/84 98 Room Air  











Notes


Mental Status:  alert / awake / arousable, participated in evaluation


Pt Amnestic to Procedure:  Yes


Nausea / Vomiting:  adequately controlled


Pain:  adequately controlled


Airway Patency, RR, SpO2:  stable & adequate


BP & HR:  stable & adequate


Hydration State:  stable & adequate


Anesthetic Complications:  no major complications apparent


Doing well. Comfortable. VSS.

## 2017-08-29 NOTE — [facility] POST OPERATIVE BRIEF NOTE
Immediate Operative Summary


Operative Date


Aug 29, 2017.





Pre-Operative Diagnosis





Right Carotid Stenosis





Post-Operative Diagnosis





Right Carotid Stenosis





Procedure(s) Performed





Right Carotid Endarterectomy with patch





Surgeon


Dr. Childs





Assistant Surgeon(s)


Yeison Luna- Resident





Estimated Blood Loss


50ML





Findings


severe plaque of ica origin





Specimens





A. Right Carotid Plaque





Anesthesia


Gen





Complication(s)


None





Disposition


Recovery Room / PACU

## 2017-08-29 NOTE — CRITICAL CARE CONSULTATION
Critical Care Consultation


Date of Consultation:


Aug 29, 2017.


Attending Physician:


Cisco Childs M.D.


Reason for Consultation:


Status Post CEA w/ Patch


History of Present Illness


Patient is a 81-year-old female with a significant past mental history for 

hypertension, TIAs, and breast cancer who was admitted to the ICU 

postoperatively status post RIGHT carotid endarterectomy with patch performed 

by Dr. Childs.  She had an estimated blood loss of 50 mL.  There were no 

complications reported intraoperatively or postoperatively.





Patient and family provide history.  Patient has a history of hypertension as 

well as previous history of smoking.  She quit in 2005.  Approximately 5 years 

ago, the patient had experienced "a few" TIAs.  She was evaluated and found to 

have significant LEFT-sided carotid stenosis.  She underwent LEFT-sided CEA in 

2014.  Serial ultrasounds demonstrated worsening deposition to the RIGHT sided 

carotid.  She was referred to Dr. Childs by her primary care provider.  She was 

asymptomatic from this RIGHT-sided stenosis, thankfully.  The patient is 

currently awake and rates mild discomfort at the incision site rating her 

discomfort a 2/10.  She reports some pain with swallowing as well.  She 

currently denies any headaches, dizziness, lightheadedness, blurry vision, 

double vision, nausea, vomiting, chest pain, palpitations, shortness of breath, 

vomiting, or abdominal pain.





Past Medical/Surgical History


HTN


Hyperlipidemia


TIAs


Breast CA





Family History





Cancer


Diabetes mellitus


Gallbladder disease


Hypertension


Lung disease





Social History


Smoking Status:  Former Smoker


Smokeless Tobacco Use:  No


Drug Use:  none


Marital Status:  


Housing Status:  lives with significant other


Occupation Status:  retired





Allergies


Coded Allergies:  


     Penicillins (Verified  Allergy, Intermediate, "swelling" (pt does not 

remember where), 8/29/17)





Home Medications


Scheduled


Aspirin (Aspirin Ec), 81 MG PO QAM


Diltiazem Hcl Extended Release (Tiazac 360 Mg), 360 MG PO QAM


Lisinopril (Lisinopril), 40 MG PO BID


Simvastatin (Zocor), 20 MG PO Q2D





Current Inpatient Medications





Current Inpatient Medications








 Medications


  (Trade)  Dose


 Ordered  Sig/Queenie


 Route  Start Time


 Stop Time Status Last Admin


Dose Admin


 


 Lactated Ringer's  1,000 ml @ 


 15 mls/hr  Q24H


 IV  8/29/17 06:00


 8/30/17 05:59   


 


 


 Clindamycin


 Phosphate  54 ml @ 


 100 mls/hr  TODAY@07


 IV  8/29/17 06:00


 8/29/17 18:00  8/29/17 07:28


100 MLS/HR


 


 Lactated Ringer's  1,000 ml @ 


 80 mls/hr  [id number]


 IV  8/29/17 06:00


 8/29/17 18:29  8/29/17 05:57


80 MLS/HR


 


 Acetaminophen


  (Tylenol Tab)  650 mg  Q4H  PRN


 PO  8/29/17 10:00


 9/28/17 09:59   


 


 


 Oxycodone/


 Acetaminophen


  (Percocet


 5-325mg Tab)  FOR


 MODERATE


 PAIN ...  Q4H  PRN


 PO  8/29/17 10:00


 9/12/17 09:59   


 


 


 Morphine Sulfate


  (MoRPHine


 SULFATE INJ)  4 mg  Q4H  PRN


 IV  8/29/17 10:00


 9/12/17 09:59   


 


 


 Ondansetron HCl


  (Zofran Inj)  4 mg  Q6H  PRN


 IV  8/29/17 10:00


 9/28/17 09:59   


 


 


 Clindamycin


 Phosphate 600 mg/


 Dextrose  54 ml @ 


 100 mls/hr  Q8H


 IV  8/29/17 12:00


 8/29/17 20:33  8/29/17 12:16


100 MLS/HR


 


 Metoprolol


 Tartrate


  (Lopressor Iv)  5 mg  Q10M  PRN


 IV  8/29/17 10:00


 9/28/17 09:59   


 


 


 Nitroglycerin/


 Dextrose  250 ml @ 0


 mls/hr  Q0M PRN


 IV  8/29/17 09:55


 9/28/17 09:54   


 


 


 Sodium


 Nitroprusside 50


 mg/Dextrose  502 ml @ 0


 mls/hr  Q0M PRN


 IV  8/29/17 09:55


 9/28/17 09:54   


 


 


 Dextrose/Sodium


 Chloride  1,000 ml @ 


 125 mls/hr  Q8H


 IV  8/29/17 11:30


 9/28/17 11:29  8/29/17 12:16


125 MLS/HR


 


 Phenylephrine HCl


 20 mg/Dextrose  502 ml @ 0


 mls/hr  Q0M PRN


 IV  8/29/17 09:55


 9/28/17 09:54   


 


 


 Enoxaparin Sodium


  (Lovenox Inj)  30 mg  DAILY@0800


 SQ  8/30/17 08:00


 9/29/17 07:59   


 


 


 Aspirin


  (Ecotrin Tab)  81 mg  QAM


 PO  8/30/17 09:00


 9/29/17 08:59   


 


 


 Diltiazem HCl


  (TIAzac CAP)  360 mg  QAM


 PO  8/30/17 09:00


 9/29/17 08:59   


 


 


 Lisinopril


  (Zestril Tab)  40 mg  BID


 PO  8/29/17 21:00


 9/28/17 20:59   


 


 


 Simvastatin


  (Zocor Tab)  20 mg  Q2D@2100


 PO  8/29/17 21:00


 9/28/17 20:59   


 











Review of Systems


A complete 10-point Review of Systems was discussed with the patient, with 

pertinent positives and negatives listed in the History of Present Illness. All 

remaining Review of Systems questions can be considered negative unless 

otherwise specified.





Physical Exam











  Date Time  Temp Pulse Resp B/P (MAP) Pulse Ox O2 Delivery O2 Flow Rate FiO2


 


8/29/17 11:35      Nasal Cannula 2.0 


 


8/29/17 11:35 36.5 45 16 141/64 (89) 95 Nasal Cannula 2.0 





    144/49 (80)    


 


8/29/17 11:25  50 16 121/48 100 Nasal Cannula 2 


 


8/29/17 11:23  51 16 135/53 100 Nasal Cannula 2 


 


8/29/17 11:15 36.2 51 16 135/52 100 Nasal Cannula 2 


 


8/29/17 11:05  55 16 134/52 100 Oxymask 3 


 


8/29/17 10:55  56 16 134/58 100 Oxymask 5 


 


8/29/17 10:45  59 16 139/65 100 Oxymask 5 


 


8/29/17 10:35 36.0 62 16 152/69 100 Oxymask 10 


 


8/29/17 05:58 36.4 57 18 164/84 98 Room Air  








VITAL SIGNS - Vital signs and nursing notes were reviewed.


GENERAL - 81-year-old female appearing her stated age who is in no acute 

distress. Communicates well with provider and answers questions appropriately.


HEAD - Normocephalic, Atraumatic. No Jackson's Sign or Raccoon's Eyes. No 

depressed skull fractures palpable.


EYES - PERRL with EOMI bilaterally. Sclera anicteric.  Arcus senilis present 

bilaterally. Palpebral conjunctiva pink and moist with no injection noted.


EARS - No deformities of external structures noted on gross examination 

bilaterally.


NOSE - Midline and without cyanosis. No epistaxis or purulent drainage noted. 

Septum midline without deviation or septal hematoma noted.


MOUTH/OROPHARYNX - Without perioral cyanosis. Buccal mucosa pink and dry. 

Tongue midline with equal elevation of palate bilaterally. No tonsillar 

hypertrophy, erythema, or exudates noted.


NECK - Surgical incision present to the RIGHT sided neck. No hematoma or 

significant edema appreciated. No palpable thrill. No audile bruit. Supple to 

palpation.


LUNGS - Chest wall symmetric without accessory muscle use, intercostals 

retractions, or central cyanosis. Normal vesicular breath sounds CTA B/L. No 

wheezes, rales, or rhonchi appreciated.


CARDIAC - RRR with S1/S2. No murmur, rubs, or gallops appreciated.


ABDOMEN - Abdominal contour flat and without pulsations or visible masses. BS 

normoactive all four quadrants. No tenderness, palpable masses, 

hepatosplenomegaly, or ascites noted.


EXTREMITIES - No pretibial edema present. +3/5 radial and dorsalis pedis pulses 

palpated throughout. +5/5 strength noted in UE/LE bilaterally.


NEUROLOGIC - Cranial nerves II through XII grossly intact. Sensory intact to 

light touch throughout.


PSYCH - A&Ox3 and cooperates fully with examiner. Pt is very pleasant and 

interacts well with examiner.





Diagnostic Results


EKG was obtained and reviewed by myself.  EKG demonstrates a sinus bradycardia 

at a rate of 40 bpm.  Incomplete right bundle persists.  .  When 

compared to EKG on 8/11/2017, no significant changes appreciated.





Assessment & Plan





(1) Hypertension


(2) Stenosis of right carotid artery without infarction


(3) Hypertension Nos


(4) Hyperlipidemia Nec/Nos


Reason Critically Ill: Status Post RIGHT CEA w/ Patch. Bradycardia.





Neuro -


* CAM ICU: NEGATIVE


* Treat pain w/ Tylenol, Percocet, Morphine as ordered.


* History of TIAs - neuro checks per protocol.





Cardiac -


* Hypertension.


 * Diltiazem, Lisinopril. Restart as BP tolerates.


* Post Operative Bradycardia.


 * Asymptomatic at this point.


 * Placed Pacer Pads Preemptively.


 * EKG shows Sinus Bradycardia w/o change from prior.


 * On review of prior hospital visits and procedures, her HR is typically in 

the 60's, however she did have profound Bradycardia s/p LEFT CEA performed in 

2014. Will continue to monitor.


 * Would avoid BBs for HTN given profound bradycardia.


 * Will continue to monitor closely for any changes.





Respiratory -


* No history of pulmonary disease.


* Will monitor pulse oximetry closely.





GI - 


* Will progress diet as tolerated.


   


RENAL/LYTES -


* Creatine 1.30 - appears to be patient's baseline.


 * Continue IVF per surgeon.





 - 


* No reported issues.





ENDO - 


* No history of DM or Thyroid Dz.


* Will watch BSGs for any concerning lab values. Correct appropriately.





HEME -


* H&H stable.


* EBL 50 mL


* Will watch closely for any bleeding concerns.





ID -


* Receiving Clindy IV postoperatively per surgeon.


* Will monitor fever curve.





LINES/IV ACCESS - 


* PIV intact.





DVT PROPHYLAXIS -


* Lovenox 30 mg SQ daily.


* SCDs.








Thank you for this consultation allow us to be part of this patient's care.  

Please refer to my attending physician's documentation for any further 

recommendations.





I have personally evaluated and examined this patient.  I agree with assessment 

and plan of JOHN Webb PA-C.

## 2017-08-30 VITALS
SYSTOLIC BLOOD PRESSURE: 129 MMHG | TEMPERATURE: 98.42 F | HEART RATE: 54 BPM | OXYGEN SATURATION: 98 % | DIASTOLIC BLOOD PRESSURE: 62 MMHG

## 2017-08-30 VITALS — SYSTOLIC BLOOD PRESSURE: 148 MMHG | OXYGEN SATURATION: 97 % | DIASTOLIC BLOOD PRESSURE: 63 MMHG | HEART RATE: 59 BPM

## 2017-08-30 VITALS
OXYGEN SATURATION: 98 % | DIASTOLIC BLOOD PRESSURE: 48 MMHG | HEART RATE: 42 BPM | TEMPERATURE: 98.6 F | SYSTOLIC BLOOD PRESSURE: 132 MMHG

## 2017-08-30 VITALS
TEMPERATURE: 98.24 F | OXYGEN SATURATION: 96 % | HEART RATE: 59 BPM | DIASTOLIC BLOOD PRESSURE: 47 MMHG | SYSTOLIC BLOOD PRESSURE: 118 MMHG

## 2017-08-30 VITALS
TEMPERATURE: 98.42 F | OXYGEN SATURATION: 99 % | SYSTOLIC BLOOD PRESSURE: 136 MMHG | HEART RATE: 60 BPM | DIASTOLIC BLOOD PRESSURE: 57 MMHG

## 2017-08-30 VITALS
TEMPERATURE: 98.42 F | HEART RATE: 42 BPM | DIASTOLIC BLOOD PRESSURE: 52 MMHG | SYSTOLIC BLOOD PRESSURE: 125 MMHG | OXYGEN SATURATION: 98 %

## 2017-08-30 VITALS — SYSTOLIC BLOOD PRESSURE: 135 MMHG | HEART RATE: 59 BPM | DIASTOLIC BLOOD PRESSURE: 53 MMHG | OXYGEN SATURATION: 98 %

## 2017-08-30 VITALS
HEART RATE: 42 BPM | SYSTOLIC BLOOD PRESSURE: 132 MMHG | OXYGEN SATURATION: 98 % | TEMPERATURE: 98.6 F | DIASTOLIC BLOOD PRESSURE: 48 MMHG

## 2017-08-30 LAB
CREAT CL PREDICTED SERPL C-G-VRATE: 27.3 ML/MIN
CREAT SERPL-MCNC: 1.2 MG/DL (ref 0.6–1.2)

## 2017-08-30 RX ADMIN — DEXTROSE AND SODIUM CHLORIDE SCH MLS/HR: 5; .45 INJECTION, SOLUTION INTRAVENOUS at 06:44

## 2017-08-30 RX ADMIN — DEXTROSE AND SODIUM CHLORIDE SCH MLS/HR: 5; .45 INJECTION, SOLUTION INTRAVENOUS at 11:30

## 2017-08-30 RX ADMIN — LISINOPRIL SCH MG: 20 TABLET ORAL at 07:59

## 2017-08-30 RX ADMIN — ACETAMINOPHEN PRN MG: 325 TABLET ORAL at 02:28

## 2017-08-30 NOTE — DISCHARGE INSTRUCTIONS
Discharge Instructions


Date of Service


Aug 30, 2017.





Admission


Reason for Admission:  Carotid Stenosis





Discharge


Discharge Diagnosis / Problem:  Post carotid endartectomy





Discharge Goals


Goal(s):  Therapeutic intervention





Activity Recommendations


Activity Limitations:  per Instructions/Follow-up section





.





Instructions / Follow-Up


Instructions / Follow-Up





SPECIAL CARE INSTRUCTIONS:





Medications:





*  Continue to take Aspirin as directed. 





Incision Care:





*  You may shower, but do not rub incision.  You may let the warm soapy water 

run


   over it.  Be sure to dry the incision well after bathing.


*  Do not shave directly over the incision until it is healed.


*  DO NOT IMMERSE THE INCISION IN A TUB/POOL/etc. UNTIL HEALED.





Restrictions:





*  Do not drive for at least one week or if you are still taking any narcotic 

pain medication.


*  Do not lift anything heavier than a gallon of milk for one week after going 

home.





Possible Complications:





*  Numbness - It is normal to have some numbness around the incision.  Numbness


   can extend beyond the incision to areas of the neck, ear and face.  The 

numbness


   is due to bruising of nerves during the surgery and will gradually improve 

over a 


   period of months.





*  Hoarseness/Difficulty Speaking and Swallowing - The bruising of nerves in 

the 


   neck can also cause a hoarse voice, difficulty speaking or swallowing.  This 

may 


   improve over time, HOWEVER, if it continues for more than a few days please 


   contact our office ([phone]).





*  Excessive Swelling - There will be some swelling immediately after surgery 


   which usually resolves within one week.  If you notice that the swelling is 

getting 


   worse, notify your surgeon ([phone]).





*  Drainage/Bleeding - If there is any drainage or bleeding, it should be a very


   small amount (less than a teaspoon per day).  If you have excessive bleeding 

or


   drainage from the incision, call your surgeon ([phone]) right away.








ACTIVATION OF EMERGENCY MEDICAL SYSTEM:





Call 911, immediately, if you experience any of the following:





Warning Signs and Symptoms of Stroke:





*  Sudden numbness or weakness of the face, arm or leg, especially on one side 

of the body


*  Sudden confusion, trouble speaking or understanding


*  Sudden trouble seeing in one or both eyes


*  Sudden trouble walking, dizziness, loss of balance or coordination


*  Sudden severe headache with no cause





Do not delay calling 911 if you experience any warning signs or symptoms of a 

stroke.


Delay in seeking medical attention may affect what treatments can be given to 

you.





Risk Factors for Stroke:





You can reduce your chances of stroke by working with your medical provider to 

adopt a healthy


lifestyle.  Some specific ways to lower your chance of stroke are:





*  If you are a smoker, now is the time to stop smoking cigarettes


*  If you are diabetic, improve the control of your blood sugars


*  Avoid excessive amounts of alcohol


*  Control high blood pressure


*  Lose weight if you are overweight


*  Be sure to lead an active lifestyle


*  Eat a healthy diet low in salt, cholesterol and fat





You should know about other risk factors for stroke that you are unable to 

control.  


These include:





*  Age 55 years or older


*  Male gender


*  Certain racial groups: ,  or /


*  Family History of Stroke, Mini stroke or Heart Attack


*  Sickle Cell Disease





You will be receiving a call from the Vascular Surgery Nurse after you


are discharged.








FOLLOW UP VISIT:





It is important for you to keep your follow up appointments with your medical 

provider.





Keep any scheduled doctor appointments.


ACTIVITY RECOMMENDATIONS:





See Above





SPECIAL CARE INSTRUCTIONS:





Call your doctor if:





*  Temperature above 101 degrees


*  Pain not relieved by pain medicine ordered


*  There is increased drainage or redness from any incision


*  You have any unanswered questions or concerns.








Current Hospital Diet


Patient's current hospital diet: AHA Diet (Heart Healthy)





Discharge Diet


Recommended Diet:  AHA Diet (Heart Healthy)





Procedures


Procedures Performed:  


Right Carotid Endarterectomy with patch





Pending Studies


Studies pending at discharge:  no





Medical Emergencies








.


Who to Call and When:





Medical Emergencies:  If at any time you feel your situation is an emergency, 

please call 911 immediately.





.





Non-Emergent Contact


Non-Emergency issues call your:  Surgeon


.








"Provider Documentation" section prepared by Cisco Childs.








.





VTE Core Measure


Inpt VTE Proph given/why not?:  Enoxaparin (Lovenox)





[place] Drug Monitoring Program


Search Results:  no issues identified

## 2017-08-30 NOTE — PROGRESS NOTE
Progress Note


Date of Service:


Aug 30, 2017.


Subjective


No complaints today.  Had left finger tingling last pm.  Also had headache.  

She doesnt remember either.





Problem List


Medical Problems:


(1) Tick bite of left upper back excluding scapular region


Status: Acute  











Objective


Vital Signs





Vital Signs Past 12 Hours








  Date Time  Temp Pulse Resp B/P (MAP) Pulse Ox O2 Delivery O2 Flow Rate FiO2


 


8/30/17 09:35 36.9 42 16 125/52 (76) 98 Room Air  


 


8/30/17 07:30     98 Room Air 2.0 


 


8/30/17 07:30 36.9 54 17 129/62 (84) 98 Room Air  


 


8/30/17 06:00  59 17 135/53 (80) 98 Room Air  


 


8/30/17 04:00      Room Air  


 


8/30/17 04:00 36.9 60 16 136/57 (83) 99 Room Air  


 


8/30/17 02:00  59 16 148/63 (91) 97 Room Air  


 


8/30/17 00:01 36.8 59 15 118/47 (70) 96 Room Air  


 


8/29/17 23:59      Room Air  








Exam


VSS Afebrile 


Incision dry and clean


Does have some oral dyskinesia


No facial droop of cheek, eye for forehead


Left hand with good flow


Laboratory and Microbiology





Results Past 24 Hours








Test


  8/29/17


16:16 8/30/17


00:04 8/30/17


06:03 8/30/17


06:07 Range/Units


 


 


Bedside Glucose 177 196  171 70-90  mg/dl


 


Creatinine


  


  


  1.20


  


  0.60-1.20


mg/dl


 


Est Creatinine Clear Calc


Drug Dose 


  


  27.3


  


   ml/min


 


 


Estimated GFR (


American) 


  


  49.8


  


   


 


 


Estimated GFR (Non-


American 


  


  43.0


  


   


 








Microbiology Results


8/29/17 MRSA DNA Surveillance Screen - Final, Complete


          Specimen Negative for MRSA by DNA Probe





Imp:


   Post CEA


   Post op headache


   Perioral dysfunction





Plan:


   Patient had tingling of her left fingers last pm.  Also had headache.  No 

problem with either today.


   She does have oral dysfunction.  Not sure if small embolic stroke or 

marginal mandibular nerve paresis which is more likely.


   Doing well.  Will d/c today.

## 2017-08-30 NOTE — ANESTHESIOLOGY PROGRESS NOTE
Anesthesia Post Op Note


Date & Time


Aug 30, 2017 at 08:37





Vital Signs





Vital Signs Past 12 Hours








  Date Time  Temp Pulse Resp B/P (MAP) Pulse Ox O2 Delivery O2 Flow Rate FiO2


 


8/30/17 06:00  59 17 135/53 (80) 98 Room Air  


 


8/30/17 04:00      Room Air  


 


8/30/17 04:00 36.9 60 16 136/57 (83) 99 Room Air  


 


8/30/17 02:00  59 16 148/63 (91) 97 Room Air  


 


8/30/17 00:01 36.8 59 15 118/47 (70) 96 Room Air  


 


8/29/17 23:59      Room Air  


 


8/29/17 22:00  52 15 128/44 (72) 95 Room Air  











Notes


Mental Status:  alert / awake / arousable, participated in evaluation


Pt Amnestic to Procedure:  Yes


Nausea / Vomiting:  adequately controlled


Pain:  adequately controlled


Airway Patency, RR, SpO2:  stable & adequate


BP & HR:  stable & adequate


Hydration State:  stable & adequate


Anesthetic Complications:  no major complications apparent

## 2017-09-05 NOTE — DISCHARGE SUMMARY
ADMISSION DIAGNOSIS:  Severe right internal carotid artery stenosis.

 

DISCHARGE DIAGNOSES:

1.  Status post right carotid endarterectomy with patch.

2.  Severe right internal carotid artery stenosis.

 

DISCHARGE CONDITION:  Stable.

 

CONSULTATIONS IN THE HOSPITAL:  Included critical care during one night ICU

hospital stay.

 

PROCEDURES IN THE HOSPITAL:  Included right carotid endarterectomy with

patch, which was performed on 08/29/2017 with an EBL of 50 mL and no

significant complication.

 

HISTORY OF PRESENT ILLNESS:  Ms. Vogt is a 81-year-old female with a

previous history of a left hemispheric CVA who had previously undergone a

left carotid endarterectomy performed by Dr. Childs a few years ago.  She has

since been following with Dr. Childs due to a 60% stenosis of her right

internal carotid artery.  During a regular surveillance ultrasound, it

indicated 90% stenosis which was a significant change from the previous 6

months ultrasound.  We ordered a CTA to confirm and this did confirm over 90%

stenosis of her internal carotid artery and carotid endarterectomy was then

recommended.  The procedure, risks, benefits, alternatives were discussed

with the patient.  She expressed understanding and agreement to proceed.

 

HOSPITAL COURSE:  The patient was admitted on 08/29/2017 after undergoing her

right carotid endarterectomy and this was performed without significant

complications.  She had an EBL of 50 mL.  Labs and vital signs remained

essentially stable.  She had a mild right lip droop postoperatively, but no

difficulty speaking or swallowing.  She was ambulating well and her chronic

right hand numbness remained the same.  Nursing staff did question whether

the patient could have had a CVA due to the mild right lip droop; however, CT

scan indicated no new infarct and her carotid artery ultrasound demonstrated

a patent endarterectomy site.  She was felt to be stable enough for discharge

on postop day 1.

 

PHYSICAL EXAMINATION:

VITAL SIGNS:  On day of discharge, her vital signs were as follows:  Pulse of

54, temperature of 37, respiratory rate of 16, blood pressure 132/48 with

pulse oximetry of 98% on room air.

CONSTITUTIONAL:  The patient is a mildly chronically ill-appearing elderly

female in no acute distress.  She ambulates without assistance and is active,

alert and oriented x4.

HEAD:  Normocephalic and atraumatic.

EYES:  EOMI, ENMT demonstrates no hearing loss, rhinorrhea or pharyngeal

erythema.

NECK:  Left side of her neck is supple, nontender without any masses.  Her

right surgical endarterectomy site does demonstrate an incision which is

intact with sutures into the skin.  There is some mild local ecchymosis,

edema and tenderness.  There is no erythema and no discharge noted.

HEART:  Demonstrated a regular rate and rhythm.

LUNGS:  Decreased, but clear.

EXTREMITIES:  Peripheral pulses are full and equal in all extremities unless

otherwise noted.  Specifically, they are in her brachial, radial and femoral

pulses.  Lower extremity distal pulses are +1.  Brisk capillary refill and no

sign of distal ischemia.

ABDOMEN:  Soft, nontender with normoactive bowel sounds in all 4 quadrants

without guarding or rebound.  There was no flank or CVA tenderness.

NEUROLOGIC:  She has grossly intact cranial nerves and grossly intact

sensation.  She has 4/5  strength in her right hand, which is chronic

from her old CVA and 5/5 strength in her left.  She has a slight droop to the

right lower portion of her lip, consistent with location of her recent

surgery.  There is no facial droop or facial paralysis otherwise.  She has no

other focal neurological deficits.

 

DIET UPON DISCHARGE:  Should be a low-cholesterol AHA diet.

 

MEDICATIONS:  Reconciled on the chart and are as per her discharge

instructions.

 

FOLLOWUP:  Should be with Dr. Childs or his PA, Priya Donovan in 2 weeks

for reevaluation.  She was advised to call the office for any other

questions.

 

Thank you for allowing us to participate in the care.

## 2018-03-12 ENCOUNTER — HOSPITAL ENCOUNTER (OUTPATIENT)
Dept: HOSPITAL 45 - C.MAMM | Age: 83
Discharge: HOME | End: 2018-03-12
Attending: NURSE PRACTITIONER
Payer: COMMERCIAL

## 2018-03-12 DIAGNOSIS — Z12.31: Primary | ICD-10-CM

## 2018-03-12 NOTE — MAMMOGRAPHY REPORT
UNILATERAL LEFT DIGITAL SCREENING MAMMOGRAM TOMOSYNTHESIS WITH CAD: 3/12/2018

CLINICAL HISTORY: Asymptomatic. Personal history of breast cancer.  





TECHNIQUE:  Left breast tomosynthesis in addition to standard 2D mammography was performed. Current rosetta garg was also evaluated with a Computer Aided Detection (CAD) system.  



COMPARISON: Comparison is made to exams dated:  3/9/2017 mammogram, 3/7/2016 mammogram, 3/10/2015 anand
mogram, 3/4/2015 mammogram, 3/3/2014 mammogram, and 3/1/2013 mammogram - [facility]
.   



BREAST COMPOSITION:  There are scattered areas of fibroglandular density in the left breast.  



FINDINGS: There are mild vascular calcifications in the left breast.  No suspicious mass, architectur
al distortion or cluster of microcalcifications is seen.  



IMPRESSION:  ACR BI-RADS CATEGORY 1: NEGATIVE

There is no mammographic evidence of malignancy. A 1 year screening mammogram is recommended.  The pa
tient will receive written notification of the results.  





Approximately 10% of breast cancers are not detected with mammography. A negative mammographic report
 should not delay biopsy if a clinically suggestive mass is present.



Noa Colby M.D.          

ay/:3/12/2018 11:38:29  



Imaging Technologist: Nat MELO(ROSALINA)(M), [facility]

letter sent: Normal 1/2  

BI-RADS Code: ACR BI-RADS Category 1: Negative

## 2018-04-24 ENCOUNTER — HOSPITAL ENCOUNTER (OUTPATIENT)
Dept: HOSPITAL 45 - C.LAB1850 | Age: 83
Discharge: HOME | End: 2018-04-24
Attending: NURSE PRACTITIONER
Payer: COMMERCIAL

## 2018-04-24 DIAGNOSIS — M85.80: ICD-10-CM

## 2018-04-24 DIAGNOSIS — E55.9: ICD-10-CM

## 2018-04-24 DIAGNOSIS — N18.3: ICD-10-CM

## 2018-04-24 DIAGNOSIS — I10: ICD-10-CM

## 2018-04-24 DIAGNOSIS — E78.00: Primary | ICD-10-CM

## 2018-04-24 LAB
ALBUMIN SERPL-MCNC: 4 GM/DL (ref 3.4–5)
ALP SERPL-CCNC: 60 U/L (ref 45–117)
ALT SERPL-CCNC: 20 U/L (ref 12–78)
AST SERPL-CCNC: 17 U/L (ref 15–37)
BASOPHILS # BLD: 0.01 K/UL (ref 0–0.2)
BASOPHILS NFR BLD: 0.1 %
BUN SERPL-MCNC: 27 MG/DL (ref 7–18)
CALCIUM SERPL-MCNC: 9.1 MG/DL (ref 8.5–10.1)
CO2 SERPL-SCNC: 24 MMOL/L (ref 21–32)
CREAT SERPL-MCNC: 1.36 MG/DL (ref 0.6–1.2)
EOS ABS #: 0.08 K/UL (ref 0–0.5)
EOSINOPHIL NFR BLD AUTO: 294 K/UL (ref 130–400)
GLUCOSE SERPL-MCNC: 83 MG/DL (ref 70–99)
HCT VFR BLD CALC: 39.2 % (ref 37–47)
HGB BLD-MCNC: 13.7 G/DL (ref 12–16)
IG#: 0.02 K/UL (ref 0–0.02)
IMM GRANULOCYTES NFR BLD AUTO: 30.6 %
KETONES UR QL STRIP: 77 MG/DL
LYMPHOCYTES # BLD: 2.24 K/UL (ref 1.2–3.4)
MCH RBC QN AUTO: 31.3 PG (ref 25–34)
MCHC RBC AUTO-ENTMCNC: 34.9 G/DL (ref 32–36)
MCV RBC AUTO: 89.5 FL (ref 80–100)
MONO ABS #: 0.53 K/UL (ref 0.11–0.59)
MONOCYTES NFR BLD: 7.3 %
NEUT ABS #: 4.43 K/UL (ref 1.4–6.5)
NEUTROPHILS # BLD AUTO: 1.1 %
NEUTROPHILS NFR BLD AUTO: 60.6 %
PH UR: 186 MG/DL (ref 0–200)
PMV BLD AUTO: 9.4 FL (ref 7.4–10.4)
POTASSIUM SERPL-SCNC: 4.3 MMOL/L (ref 3.5–5.1)
PROT SERPL-MCNC: 7.4 GM/DL (ref 6.4–8.2)
RED CELL DISTRIBUTION WIDTH CV: 13.5 % (ref 11.5–14.5)
RED CELL DISTRIBUTION WIDTH SD: 44.4 FL (ref 36.4–46.3)
SODIUM SERPL-SCNC: 136 MMOL/L (ref 136–145)
WBC # BLD AUTO: 7.31 K/UL (ref 4.8–10.8)

## 2018-05-13 ENCOUNTER — HOSPITAL ENCOUNTER (EMERGENCY)
Dept: HOSPITAL 45 - C.EDB | Age: 83
Discharge: HOME | End: 2018-05-13
Payer: COMMERCIAL

## 2018-05-13 VITALS — HEART RATE: 64 BPM | DIASTOLIC BLOOD PRESSURE: 64 MMHG | OXYGEN SATURATION: 100 % | SYSTOLIC BLOOD PRESSURE: 125 MMHG

## 2018-05-13 VITALS
BODY MASS INDEX: 23.6 KG/M2 | HEIGHT: 57.99 IN | HEIGHT: 57.99 IN | BODY MASS INDEX: 23.6 KG/M2 | WEIGHT: 112.44 LBS | WEIGHT: 112.44 LBS

## 2018-05-13 VITALS — TEMPERATURE: 97.7 F

## 2018-05-13 DIAGNOSIS — E78.5: ICD-10-CM

## 2018-05-13 DIAGNOSIS — R21: Primary | ICD-10-CM

## 2018-05-13 DIAGNOSIS — K21.9: ICD-10-CM

## 2018-05-13 DIAGNOSIS — Z87.891: ICD-10-CM

## 2018-05-13 DIAGNOSIS — I10: ICD-10-CM

## 2018-05-13 DIAGNOSIS — Z88.0: ICD-10-CM

## 2018-05-13 DIAGNOSIS — I48.91: ICD-10-CM

## 2018-05-13 NOTE — EMERGENCY ROOM VISIT NOTE
History


Report prepared by Samantha:  Sundar Barron


Under the Supervision of:  Dr. Daryn Rosales M.D.


First contact with patient:  14:53


Chief Complaint:  RASH


Stated Complaint:  RASH ON RIGHT UPPER ARM





History of Present Illness


The patient is an 82 year old female who presents to the Emergency Room with 

complaints of a worsening rash on her left flank and right bicep. The patient 

states that she was doing yard work a few days ago, and she got this rash. She 

notes that the rash is sore and red. The patient states that no other areas on 

her body have a rash or are itchy. She reports that she did not feel anything 

bite her. The patient states that she is allergic to penicillin. She denies any 

fever or chills, chest pain, nausea, vomiting. The patient has no history of 

diabetes.





   Source of History:  patient


   Onset:  a few days ago


   Position:  arm (right), other (left flank)


   Quality:  other (rash)


   Timing:  worsening


   Associated Symptoms:  No fevers, No chills





Review of Systems


See HPI for pertinent positives and negatives.  A total of 6 systems were 

reviewed and were otherwise negative.





Past Medical & Surgical


Medical Problems:


(1) Atrial Fibrillation


(2) Breast cancer


(3) Esophageal Reflux


(4) Hyperlipidemia Nec/Nos


(5) Hypertension


(6) Hypertension Nos


(7) Stenosis of right carotid artery without infarction








Family History





Cancer


Diabetes mellitus


Gallbladder disease


Hypertension


Lung disease





Social History


Smoking Status:  Former Smoker


Alcohol Use:  none


Drug Use:  none


Marital Status:  


Housing Status:  lives with significant other


Occupation Status:  retired





Current/Historical Medications


Scheduled


Aspirin (Aspirin Ec), 81 MG PO QAM


Clopidogrel (Plavix), 75 MG PO DAILY


Diltiazem Hcl Extended Release (Tiazac 360 Mg), 360 MG PO QAM


Hydralazine HCl (Hydralazine HCl), 25 MG PO DAILY


Lisinopril (Lisinopril), 40 MG PO BID


Prednisone (Prednisone), 20 MG PO DAILY


Simvastatin (Zocor), 20 MG PO Q2D





Allergies


Coded Allergies:  


     Penicillins (Verified  Allergy, Intermediate, "swelling" (pt does not 

remember where), 5/13/18)





Physical Exam


Vital Signs











  Date Time  Temp Pulse Resp B/P (MAP) Pulse Ox O2 Delivery O2 Flow Rate FiO2


 


5/13/18 14:42 36.5 56 20 127/59 100 Room Air  











Physical Exam


GENERAL: Awake, alert, well-appearing, in no distress


HENT: Normocephalic, atraumatic. Oropharynx unremarkable.


EYES: Normal conjunctiva. Sclera non-icteric.


NECK: Supple. No nuchal rigidity. FROM. No masses.


RESPIRATORY: Clear to auscultation. No wheezes. No rales.  Normal respiratory 

effort.


CARDIAC: Normal rate.  Normal rhythm. No murmurs.  No rubs. Extremities warm 

and well perfused. Pulses equal.  No JVD.


MUSCULOSKELETAL: Atraumatic. Chest examination reveals no tenderness. The back 

is symmetrical on inspection without obvious abnormality. There is no CVA 

tenderness to palpation. No joint edema. 


NEURO: Normal sensorium. No sensory or motor deficits noted. 


SKIN: Erythematous patch and swelling on the right bicep and left flank with 

some induration. No fluctuance. No signs of cellulitis. There is a small scab 

on the left flank. No jaundice noted.





Medical Decision & Procedures


Medications Administered











 Medications


  (Trade)  Dose


 Ordered  Sig/Queenie


 Route  Start Time


 Stop Time Status Last Admin


Dose Admin


 


 Prednisone


  (PredniSONE TAB)  40 mg  NOW  STAT


 PO  5/13/18 15:04


 5/13/18 15:07 DC 5/13/18 15:37


40 MG


 


 Diphenhydramine


 HCl


  (Benadryl Extra


 Strength Cream)  1 appln  NOW  STAT


 EXT  5/13/18 15:04


 5/13/18 15:07 DC 5/13/18 15:37


1 APPLN











ED Course


1453: The patient was evaluated in room D6. A complete history and physical 

exam was performed. I discussed the discharge instructions with the patient, 

and she will be discharged home.





1504: Benadryl Extra Strength Cream 1 appln EXT, Prednisone 40mg PO





Medical Decision


Prior records/ancillary studies reviewed.





Triage Nursing notes reviewed and agree them.





Additional history obtained from the family.





The patient's history was concerning for a rash.  





Differential diagnosis:


Etiologies such as contact dermatitis, urticaria, allergic reaction,Shoemaker-

Jacoby syndrome, toxic epidermal necrolysis, erythema multiforme, cellulitis, 

scabies, HSV, varicella, zoster, eczema, staph scalded skin syndrome, viral 

exanthem, fungal infection,  as well as others were entertained.  





Physical examination:


As above.  No signs of cellulitis.  Swelling and redness noted to the right 

bicep and left flank.





ER treatment provided:


Oral prednisone


Topical Benadryl cream








Diagnostic interpretation by me:


Deferred








The etiology for the patient's rash is not clearly evident at this time.  It 

looks most consistent with a contact dermatitis.





By the evaluation outlined above emergent etiologies such as Shoemaker-Jacoby 

syndrome, toxic epidermal necrolysis, erythema multiforme, cellulitis, scabies, 

HSV, varicella, zoster, staph scalded skin syndrome, urticaria, allergic 

reaction, as well as others were deemed relatively unlikely.  





The patient informed about the findings as listed above.  All questions were 

answered and she was pleased with the treatment.  Return instructions were 

outlined and the patient was discharged in stable condition.  





Outpatient prescription management:


Prednisone


Referral:





The patient was referred back to her primary care physician for follow-up in 2-

3 days for a recheck of the current condition.





Medication Reconcilliation


Current Medication List:  was personally reviewed by me





Blood Pressure Screening


Patient's blood pressure:  Normal blood pressure





Impression





 Primary Impression:  


 Rash





Scribe Attestation


The scribe's documentation has been prepared under my direction and personally 

reviewed by me in its entirety. I confirm that the note above accurately 

reflects all work, treatment, procedures, and medical decision making performed 

by me.





Departure Information


Dispostion


Home / Self-Care





Prescriptions





Prednisone (Prednisone) 20 Mg Tab


20 MG PO DAILY, #3 TAB


   Prov: Daryn Rosales MD         5/13/18





Referrals


Burak Monique III, CRNP (PCP)





Forms


HOME CARE DOCUMENTATION FORM,                                                 

               IMPORTANT VISIT INFORMATION, WORK / SCHOOL INSTRUCTIONS





Patient Instructions


My [facility]





Additional Instructions





Prednisone 20 mg daily for 3 days.





Benadryl cream to the affected areas 3-4 times daily as needed for itching.





Ice compresses to the area for 10-15 minutes 3-4 times a day for the next 2 

days.





Return to the emergency department for worsening rash, fever, puslike drainage, 

severe pain, or as needed.





Follow-up with your primary care physician this week.  Call the office tomorrow 

for an appointment.

## 2018-07-19 ENCOUNTER — HOSPITAL ENCOUNTER (OUTPATIENT)
Dept: HOSPITAL 45 - C.LAB1850 | Age: 83
Discharge: HOME | End: 2018-07-19
Attending: INTERNAL MEDICINE
Payer: COMMERCIAL

## 2018-07-19 DIAGNOSIS — N18.3: Primary | ICD-10-CM

## 2018-07-19 LAB
ALBUMIN SERPL-MCNC: 3.9 GM/DL (ref 3.4–5)
BUN SERPL-MCNC: 23 MG/DL (ref 7–18)
CALCIUM SERPL-MCNC: 8.6 MG/DL (ref 8.5–10.1)
CO2 SERPL-SCNC: 23 MMOL/L (ref 21–32)
CREAT SERPL-MCNC: 1.47 MG/DL (ref 0.6–1.2)
GLUCOSE SERPL-MCNC: 107 MG/DL (ref 70–99)
PHOSPHATE SERPL-MCNC: 3.9 MG/DL (ref 2.5–4.9)
POTASSIUM SERPL-SCNC: 4.2 MMOL/L (ref 3.5–5.1)
SODIUM SERPL-SCNC: 142 MMOL/L (ref 136–145)

## 2018-07-24 ENCOUNTER — HOSPITAL ENCOUNTER (OUTPATIENT)
Dept: HOSPITAL 45 - C.LAB | Age: 83
Discharge: HOME | End: 2018-07-24
Attending: NURSE PRACTITIONER
Payer: COMMERCIAL

## 2018-07-24 DIAGNOSIS — I10: Primary | ICD-10-CM

## 2018-07-25 ENCOUNTER — HOSPITAL ENCOUNTER (INPATIENT)
Dept: HOSPITAL 45 - C.EDB | Age: 83
LOS: 2 days | Discharge: HOME HEALTH | DRG: 641 | End: 2018-07-27
Attending: HOSPITALIST | Admitting: HOSPITALIST
Payer: COMMERCIAL

## 2018-07-25 VITALS
DIASTOLIC BLOOD PRESSURE: 75 MMHG | HEART RATE: 65 BPM | TEMPERATURE: 97.88 F | SYSTOLIC BLOOD PRESSURE: 205 MMHG | OXYGEN SATURATION: 97 %

## 2018-07-25 VITALS
HEIGHT: 59 IN | WEIGHT: 108.69 LBS | BODY MASS INDEX: 21.91 KG/M2 | HEIGHT: 59 IN | BODY MASS INDEX: 21.91 KG/M2 | WEIGHT: 108.69 LBS

## 2018-07-25 DIAGNOSIS — Z91.14: ICD-10-CM

## 2018-07-25 DIAGNOSIS — E87.1: Primary | ICD-10-CM

## 2018-07-25 DIAGNOSIS — I48.91: ICD-10-CM

## 2018-07-25 DIAGNOSIS — I11.9: ICD-10-CM

## 2018-07-25 DIAGNOSIS — Z79.899: ICD-10-CM

## 2018-07-25 DIAGNOSIS — F10.11: ICD-10-CM

## 2018-07-25 DIAGNOSIS — I16.1: ICD-10-CM

## 2018-07-25 DIAGNOSIS — E78.5: ICD-10-CM

## 2018-07-25 DIAGNOSIS — F03.90: ICD-10-CM

## 2018-07-25 DIAGNOSIS — I25.10: ICD-10-CM

## 2018-07-25 DIAGNOSIS — Z51.81: ICD-10-CM

## 2018-07-25 DIAGNOSIS — Z88.0: ICD-10-CM

## 2018-07-25 DIAGNOSIS — Z86.73: ICD-10-CM

## 2018-07-25 DIAGNOSIS — Z85.3: ICD-10-CM

## 2018-07-25 DIAGNOSIS — Z83.3: ICD-10-CM

## 2018-07-25 DIAGNOSIS — Z79.82: ICD-10-CM

## 2018-07-25 DIAGNOSIS — Z98.890: ICD-10-CM

## 2018-07-25 DIAGNOSIS — Z82.49: ICD-10-CM

## 2018-07-25 LAB
ALBUMIN SERPL-MCNC: 4.3 GM/DL (ref 3.4–5)
ALP SERPL-CCNC: 53 U/L (ref 45–117)
ALT SERPL-CCNC: 19 U/L (ref 12–78)
AST SERPL-CCNC: 22 U/L (ref 15–37)
BASOPHILS # BLD: 0.01 K/UL (ref 0–0.2)
BASOPHILS NFR BLD: 0.1 %
BUN SERPL-MCNC: 13 MG/DL (ref 7–18)
CALCIUM SERPL-MCNC: 8.9 MG/DL (ref 8.5–10.1)
CO2 SERPL-SCNC: 23 MMOL/L (ref 21–32)
CREAT SERPL-MCNC: 1.03 MG/DL (ref 0.6–1.2)
EOS ABS #: 0.06 K/UL (ref 0–0.5)
EOSINOPHIL NFR BLD AUTO: 279 K/UL (ref 130–400)
GLUCOSE SERPL-MCNC: 93 MG/DL (ref 70–99)
HCT VFR BLD CALC: 37.4 % (ref 37–47)
HGB BLD-MCNC: 12.8 G/DL (ref 12–16)
IG#: 0.01 K/UL (ref 0–0.02)
IMM GRANULOCYTES NFR BLD AUTO: 37.1 %
LYMPHOCYTES # BLD: 2.51 K/UL (ref 1.2–3.4)
MCH RBC QN AUTO: 29.7 PG (ref 25–34)
MCHC RBC AUTO-ENTMCNC: 34.2 G/DL (ref 32–36)
MCV RBC AUTO: 86.8 FL (ref 80–100)
MONO ABS #: 0.52 K/UL (ref 0.11–0.59)
MONOCYTES NFR BLD: 7.7 %
NEUT ABS #: 3.66 K/UL (ref 1.4–6.5)
NEUTROPHILS # BLD AUTO: 0.9 %
NEUTROPHILS NFR BLD AUTO: 54.1 %
PMV BLD AUTO: 9.1 FL (ref 7.4–10.4)
POTASSIUM SERPL-SCNC: 3.5 MMOL/L (ref 3.5–5.1)
PROT SERPL-MCNC: 7.4 GM/DL (ref 6.4–8.2)
RED CELL DISTRIBUTION WIDTH CV: 12.6 % (ref 11.5–14.5)
RED CELL DISTRIBUTION WIDTH SD: 40.7 FL (ref 36.4–46.3)
SODIUM SERPL-SCNC: 123 MMOL/L (ref 136–145)
WBC # BLD AUTO: 6.77 K/UL (ref 4.8–10.8)

## 2018-07-25 NOTE — DIAGNOSTIC IMAGING REPORT
HEAD WITHOUT CONTRAST (CT)



CLINICAL HISTORY: 82 years-old Female with HA .  Acute headache  



TECHNIQUE: Multiple axial CT images of the head were obtained without contrast. 

A dose lowering technique was utilized adhering to the principles of ALARA.



CT DOSE:  537.48 mGy.cm



COMPARISON: CT head 8/29/2017.



FINDINGS: 



No acute intracranial hemorrhage, midline shift, intracranial mass,

hydrocephalus, territorial ischemia or abnormal extra-axial collection.

Age-related involutional changes. Patchy areas of low-attenuation throughout the

white matter redemonstrated suggesting chronic microvascular ischemic changes.

Remote appearing lacunar infarction about the left caudate head.



The calvarium is intact.  The paranasal sinuses, mastoid air cells, and middle

ear cavities are clear.



IMPRESSION:  No acute intracranial abnormality.







The above report was generated using voice recognition software. It may contain

grammatical, syntax or spelling errors.







Electronically signed by:  Francisco Charles M.D.

7/25/2018 7:10 PM



Dictated Date/Time:  7/25/2018 7:07 PM

## 2018-07-25 NOTE — HISTORY AND PHYSICAL
History & Physical


Date & Time of Service:


Jul 25, 2018 at 21:25


Chief Complaint:


HPB


Primary Care Physician:


Burak Monique III, CRNP


History of Present Illness


Source:  patient, family, hospital records


82y/oF with hx of L hemispheric CVA, TIAs, bilateral carotid artery stenosis s/

p b/l carotid endarterectomy, HTN, HLD, and breast cancer presented with 

worsening headache that started this afternoon. Headache was 7/10 and has 

improved to 2/10 now; pounding in forehead region and intermitted. Pt reports 

high BP with the headache of 200s. Thinks she was dizzy earlier today as well 

but not exactly sure. Not sure if she took her BP meds this AM. 





Denies any dizziness now, vision changes, cp, sob, abdominal pain, n/v, d/c, or 

dysuria. She has felt fine using the bathroom and moving around while in the ED





Per son, pt has been having memory issues for a long time now. Pt's eyes look 

red to him as well





ED interval hx: received 1L NS and tylenol 1g PO





Past Medical/Surgical History


Medical Problems:


(1) Alcohol abuse with intoxication


(2) Altered mental status


(3) Altered mental status


(4) Arthritis of hand, right


(5) Atrial Fibrillation


(6) Breast cancer


(7) Carotid stenosis


(8) Carotid stenosis, symptomatic, with infarction


(9) Esophageal Reflux


(10) Hyperlipidemia Nec/Nos


(11) Hypertension


(12) Hypertension Nos


(13) Hyponatremia


(14) Injury of right hand


(15) Neuropathic pain of hand


(16) Neuropathic pain of hand


(17) Rash


(18) Stenosis of right carotid artery without infarction


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


(20) Sunburn


(21) Tick bite


(22) Tick bite


(23) Tick bite of left upper back excluding scapular region








Family History





Cancer


Diabetes mellitus


Gallbladder disease


Hypertension


Lung disease





Social History


Smoking Status:  Never Smoker


Smokeless Tobacco Use:  No


Alcohol Use:  socially


Drug Use:  none


Marital Status:  


Occupational Status:  retired





Immunizations


History of Influenza Vaccine:  Yes


Influenza Vaccine Date:  Sep 7, 2011


History of Tetanus Vaccine?:  Yes


History of Pneumococcal:  Yes


Pneumococcal Date:  Oct 29, 2010


History of Hepatitis B Vaccine:  Unknown





Allergies


Coded Allergies:  


     Penicillins (Verified  Allergy, Intermediate, "swelling" (pt does not 

remember where), 5/13/18)





Home Medications


Scheduled


Aspirin (Aspirin Ec), 81 MG PO QAM


Clopidogrel Bisulfate (Clopidogrel), 75 MG PO DAILY


Diltiazem Hcl Extended Release (Tiazac 360 Mg), 360 MG PO QAM


Hydralazine Hcl (Apresoline), 25 MG PO TID


Lisinopril (Lisinopril), 20 MG PO AMHS


Simvastatin (Simvastatin), 20 MG PO Q2D





Review of Systems


Constitutional:  No fever, No chills


Respiratory:  No shortness of breath


Cardiovascular:  No chest pain


Abdomen:  No pain, No nausea, No vomiting, No diarrhea, No constipation


Genitourinary - Female:  No dysuria


Neurologic:  + problem reported (HA )





Physical Exam


Vital Signs











  Date Time  Temp Pulse Resp B/P (MAP) Pulse Ox O2 Delivery O2 Flow Rate FiO2


 


7/25/18 19:58    178/92    


 


7/25/18 18:15 36.8 70 18 205/79 97 Room Air  








General Appearance:  no apparent distress


Head:  normocephalic, atraumatic


Eyes:  PERRL, + abnormal sclerae exam (injected)


ENT:  hearing grossly normal


Respiratory/Chest:  lungs clear, normal breath sounds, no respiratory distress


Cardiovascular:  regular rate, rhythm, + pertinent finding (2/6 systolic murmur)


Abdomen/GI:  normal bowel sounds, non tender, soft


Back:  normal inspection


Extremities/Musculoskelatal:  normal inspection, no calf tenderness, no pedal 

edema


Neurologic/Psych:  alert





Diagnostics


Laboratory Results





Results Past 24 Hours








Test


  7/25/18


18:42 7/25/18


19:11 Range/Units


 


 


White Blood Count 6.77  4.8-10.8  K/uL


 


Red Blood Count 4.31  4.2-5.4  M/uL


 


Hemoglobin 12.8  12.0-16.0  g/dL


 


Hematocrit 37.4  37-47  %


 


Mean Corpuscular Volume 86.8    fL


 


Mean Corpuscular Hemoglobin 29.7  25-34  pg


 


Mean Corpuscular Hemoglobin


Concent 34.2


  


  32-36  g/dl


 


 


Platelet Count 279  130-400  K/uL


 


Mean Platelet Volume 9.1  7.4-10.4  fL


 


Neutrophils (%) (Auto) 54.1   %


 


Lymphocytes (%) (Auto) 37.1   %


 


Monocytes (%) (Auto) 7.7   %


 


Eosinophils (%) (Auto) 0.9   %


 


Basophils (%) (Auto) 0.1   %


 


Neutrophils # (Auto) 3.66  1.4-6.5  K/uL


 


Lymphocytes # (Auto) 2.51  1.2-3.4  K/uL


 


Monocytes # (Auto) 0.52  0.11-0.59  K/uL


 


Eosinophils # (Auto) 0.06  0-0.5  K/uL


 


Basophils # (Auto) 0.01  0-0.2  K/uL


 


RDW Standard Deviation 40.7  36.4-46.3  fL


 


RDW Coefficient of Variation 12.6  11.5-14.5  %


 


Immature Granulocyte % (Auto) 0.1   %


 


Immature Granulocyte # (Auto) 0.01  0.00-0.02  K/uL


 


Sodium Level 123  136-145  mmol/L


 


Potassium Level 3.5  3.5-5.1  mmol/L


 


Chloride Level 89    mmol/L


 


Carbon Dioxide Level 23  21-32  mmol/L


 


Anion Gap 11.0  3-11  mmol/L


 


Blood Urea Nitrogen 13  7-18  mg/dl


 


Creatinine


  1.03


  


  0.60-1.20


mg/dl


 


Est Creatinine Clear Calc


Drug Dose 29.7


  


   ml/min


 


 


Estimated GFR (


American) 59.5


  


   


 


 


Estimated GFR (Non-


American 51.3


  


   


 


 


BUN/Creatinine Ratio 12.6  10-20  


 


Random Glucose 93  70-99  mg/dl


 


Calcium Level 8.9  8.5-10.1  mg/dl


 


Total Bilirubin 0.8  0.2-1  mg/dl


 


Direct Bilirubin 0.2  0-0.2  mg/dl


 


Aspartate Amino Transf


(AST/SGOT) 22


  


  15-37  U/L


 


 


Alanine Aminotransferase


(ALT/SGPT) 19


  


  12-78  U/L


 


 


Alkaline Phosphatase 53    U/L


 


Total Protein 7.4  6.4-8.2  gm/dl


 


Albumin 4.3  3.4-5.0  gm/dl


 


Ethyl Alcohol mg/dL  < 3.0 0-3  mg/dl











Diagnostic Radiology


CHEST ONE VIEW PORTABLE





HISTORY:  82 years-old Female htn  acute hypertension





COMPARISON: Chest radiograph 8/11/2017





TECHNIQUE: Portable AP view of the chest





FINDINGS: 


Cardiomediastinal and hilar silhouettes are within normal limits. Calcification


of the aorta. Linear subsegmental left basilar opacities suggest atelectasis.


There is no pneumothorax, pleural effusion or overt pulmonary edema.


Periarticular calcifications about the bilateral shoulders suggests calcific


tendinosis or calcific bursitis. Degenerative changes of the shoulders and


spine.





IMPRESSION: No acute process. 





HEAD WITHOUT CONTRAST (CT)





CLINICAL HISTORY: 82 years-old Female with HA .  Acute headache  





TECHNIQUE: Multiple axial CT images of the head were obtained without contrast. 


A dose lowering technique was utilized adhering to the principles of ALARA.





CT DOSE:  537.48 mGy.cm





COMPARISON: CT head 8/29/2017.





FINDINGS: 





No acute intracranial hemorrhage, midline shift, intracranial mass,


hydrocephalus, territorial ischemia or abnormal extra-axial collection.


Age-related involutional changes. Patchy areas of low-attenuation throughout the


white matter redemonstrated suggesting chronic microvascular ischemic changes.


Remote appearing lacunar infarction about the left caudate head.





The calvarium is intact.  The paranasal sinuses, mastoid air cells, and middle


ear cavities are clear.





IMPRESSION:  No acute intracranial abnormality.





EKG


68 NSR 





Impression


Assessment and Plan


82y/oF with hx of L hemispheric CVA, TIAs, bilateral carotid artery stenosis s/

p b/l carotid endarterectomy, HTN, HLD, hx of alcohol abuse, and breast cancer 

presented with worsening headache that started this afternoon. Now improved s/p 

1L NS and 1g PO Tylenol. Head CT negative. Found to have hyponatremia to 123. 

Not on HCTZ or loop diuretic. Pt is euvolemic without any hx or concern for HF. 

Concerning for possible beer potomania vs. SIADH vs. hypothyroidism vs. adrenal 

insufficiency. 





Headache - improving 


- CT head - neg


- Received Tylenol 1gm PO 


- Continue Tylenol PRN





Hyponatremia


- Na 123


- CXR neg


- Ordered serum and urine osmolality


- Ordered TSH and AM cortisol 


- Received 1L NS in the ED 


- On mIVF NS at 80mls/hr 





HTN/HLD/CAD


- Continue lisinopril, diltiazem, and hydralazine 


- Continue aspirin and clopidogrel


- Continue simvastatin





Alcohol abuse


- LFT wnl 


- Ordered thiamine PO 


- Ordered folic acid 1mg QAM 


- will monitor for withdrawal on AWSS protocol with Ativan





DVT prop: Heparin SQ


Full code








Attending addendum:








I have physically seen this patient, have supervised the medical residents 

activities, and agree with the H&P unless as otherwise noted.





Assessment and Plan:





Hyponatremia--


Sodium level 123, urine osmolality 178, serum osmolality 251.


1500 cc fluid restriction.





ETOH abuse--


add thiamine folic acid and nephrocaps.


alcohol withdrawal program.





Hypertension/CAD--


Continue diltiazem, hydralazine and lisinopril.


Continue aspirin and clopidogrel.





Hyperlipidemia--


continue simvastatin.





Remainder of orders and notes as above.





Advanced Directives


Existing Advance Directive:  No


Existing Living Will:  No


Existing Power of :  No





Resuscitation Status








VTE Prophylaxis


Will order VTE Prophylaxis:  Yes





Social Service Consult


None Apply


Resident Involvement:  Resident Care Provided


Care Provided:  Adult Hospital Medicine

## 2018-07-26 VITALS — SYSTOLIC BLOOD PRESSURE: 168 MMHG | DIASTOLIC BLOOD PRESSURE: 89 MMHG

## 2018-07-26 VITALS
HEART RATE: 89 BPM | OXYGEN SATURATION: 98 % | DIASTOLIC BLOOD PRESSURE: 68 MMHG | SYSTOLIC BLOOD PRESSURE: 105 MMHG | TEMPERATURE: 98.42 F

## 2018-07-26 VITALS — DIASTOLIC BLOOD PRESSURE: 94 MMHG | HEART RATE: 97 BPM | SYSTOLIC BLOOD PRESSURE: 178 MMHG

## 2018-07-26 VITALS — SYSTOLIC BLOOD PRESSURE: 165 MMHG | DIASTOLIC BLOOD PRESSURE: 71 MMHG

## 2018-07-26 VITALS — DIASTOLIC BLOOD PRESSURE: 69 MMHG | HEART RATE: 66 BPM | SYSTOLIC BLOOD PRESSURE: 180 MMHG

## 2018-07-26 VITALS — SYSTOLIC BLOOD PRESSURE: 156 MMHG | DIASTOLIC BLOOD PRESSURE: 79 MMHG | HEART RATE: 61 BPM

## 2018-07-26 VITALS
HEART RATE: 86 BPM | OXYGEN SATURATION: 92 % | DIASTOLIC BLOOD PRESSURE: 72 MMHG | SYSTOLIC BLOOD PRESSURE: 185 MMHG | TEMPERATURE: 98.6 F

## 2018-07-26 LAB
BUN SERPL-MCNC: 12 MG/DL (ref 7–18)
BUN SERPL-MCNC: 23 MG/DL (ref 7–18)
CALCIUM SERPL-MCNC: 8.7 MG/DL (ref 8.5–10.1)
CALCIUM SERPL-MCNC: 9.1 MG/DL (ref 8.5–10.1)
CO2 SERPL-SCNC: 24 MMOL/L (ref 21–32)
CO2 SERPL-SCNC: 24 MMOL/L (ref 21–32)
CREAT SERPL-MCNC: 0.86 MG/DL (ref 0.6–1.2)
CREAT SERPL-MCNC: 1.17 MG/DL (ref 0.6–1.2)
GLUCOSE SERPL-MCNC: 89 MG/DL (ref 70–99)
GLUCOSE SERPL-MCNC: 90 MG/DL (ref 70–99)
INR PPP: 1 (ref 0.9–1.1)
POTASSIUM RANDOM URINE: 15.4 MEQ/L
POTASSIUM SERPL-SCNC: 4 MMOL/L (ref 3.5–5.1)
POTASSIUM SERPL-SCNC: 4 MMOL/L (ref 3.5–5.1)
SODIUM SERPL-SCNC: 128 MMOL/L (ref 136–145)
SODIUM SERPL-SCNC: 128 MMOL/L (ref 136–145)
T4 FREE SERPL-MCNC: 247 MOMS/KG (ref 500–800)

## 2018-07-26 RX ADMIN — Medication SCH MG: at 07:36

## 2018-07-26 RX ADMIN — SODIUM CHLORIDE SCH MLS/HR: 900 INJECTION, SOLUTION INTRAVENOUS at 10:57

## 2018-07-26 RX ADMIN — LISINOPRIL SCH MG: 20 TABLET ORAL at 20:46

## 2018-07-26 RX ADMIN — HEPARIN SODIUM SCH UNIT: 10000 INJECTION, SOLUTION INTRAVENOUS; SUBCUTANEOUS at 10:35

## 2018-07-26 RX ADMIN — SODIUM CHLORIDE SCH MLS/HR: 900 INJECTION, SOLUTION INTRAVENOUS at 16:10

## 2018-07-26 RX ADMIN — Medication SCH MG: at 00:38

## 2018-07-26 RX ADMIN — LISINOPRIL SCH MG: 20 TABLET ORAL at 00:39

## 2018-07-26 RX ADMIN — SODIUM CHLORIDE SCH MLS/HR: 900 INJECTION, SOLUTION INTRAVENOUS at 00:37

## 2018-07-26 RX ADMIN — CLOPIDOGREL BISULFATE SCH MG: 75 TABLET, FILM COATED ORAL at 07:36

## 2018-07-26 RX ADMIN — LISINOPRIL SCH MG: 20 TABLET ORAL at 08:17

## 2018-07-26 RX ADMIN — DILTIAZEM HYDROCHLORIDE SCH MG: 180 CAPSULE, EXTENDED RELEASE ORAL at 07:37

## 2018-07-26 RX ADMIN — Medication SCH MG: at 07:37

## 2018-07-26 RX ADMIN — HEPARIN SODIUM SCH UNIT: 10000 INJECTION, SOLUTION INTRAVENOUS; SUBCUTANEOUS at 20:46

## 2018-07-26 NOTE — DIAGNOSTIC IMAGING REPORT
DUPLEX RENAL ARTERY



CLINICAL HISTORY: HTN hypertension



TECHNIQUE: Renal arterial Doppler



COMPARISON STUDY:  7/26/2018



FINDINGS: Somewhat limited study as the patient had difficulty in cooperating

with the exam. Bilateral renal cysts are again noted similar compared to the

prior study of 7/26/2018.



There is a slight increase in resistive indices of both kidneys. The proximal

right renal artery demonstrates a significant increase in velocities to

approximately 220 cm/s.



The remaining arterial structures of the kidneys bilaterally are unremarkable

within limitations discussed above.



IMPRESSION:  

1. Moderate/significant stenosis proximal right renal artery estimated at

60-70%.





2. Mild increase in resistive indices bilaterally indicative of chronic small

vessel change of the kidneys.

3. Bilateral renal cysts which have been described previously. 

4. Compromised exam due to patient's difficulty in cooperating as well as the

presence of the renal cyst.









The above report was generated using voice recognition software.  It may contain

grammatical, syntax or spelling errors.







Electronically signed by:  Steven Harris M.D.

7/26/2018 3:54 PM



Dictated Date/Time:  7/26/2018 3:46 PM

## 2018-07-26 NOTE — NEPHROLOGY CONSULTATION
Nephrology Consultation


Date & Providers





Date of Consultation:  Jul 26, 2018.





Primary Care Provider:  Burak Monique III, CRNP





Referring Provider:





Reason for Consultation


Evaluation of hypertension and hyponatremia





History of Present Illness


Ms. Shi is an 82 year old white female who is seen at the request of the 

[facility] group for evaluation of hypertension and hyponatremia.  

Medical records in the EMR were reviewed and are summarized as follows:  Ms. Shi has chronic kidney disease w/ baseline creatinine 1.2 - 1.5 due to 

hypertensive nephrosclerosis.  Her Nephrologist is Dr. Ferrara.  Previous 

evaluation has revealed a benign urine sediment and normal sized kidneys by 

imaging studies.  Blood pressure has been managed w/ a combination of Lisinopril

, Diltiazem and Hydralazine.  Previous use of thiazide diuretic resulted in 

progressive hyponatremia and was discontinued.  Ms. Vogt's medical history 

is also significant for breast cancer s/p mastectomy (no radiation or 

chemotherapy), PVD s/p bilateral CEA, remote h/o tobacco use and dementia.  Ms. Shi presented to the hospital for evaluation of HA.  SBP at the time of 

presentation was > 200 mmHG.  Serum sodium was 123 mmol/L.





Past Medical/Surgical History


Medical:


#  Stage III CKD (baseline creatinine 1.2 - 1.5) due to hypertensive 

nephrosclerosis


#  HTN


#  Breast CA s/p mastectomy (no radiation or chemotherapy)


#  PVD s/p bilateral CEA


#  Remote h/o tobacco use


#  Dementia


Surgical:


#  Bilateral CEA








Allergies


Coded Allergies:  


     Penicillins (Verified  Allergy, Intermediate, "swelling" (pt does not 

remember where), 5/13/18)





Inpatient Medications





Current Inpatient Medications








 Medications


  (Trade)  Dose


 Ordered  Sig/Queenie


 Route  Start Time


 Stop Time Status Last Admin


Dose Admin


 


 Acetaminophen


  (Tylenol Tab)  650 mg  Q4H  PRN


 PO  7/25/18 21:15


 8/24/18 21:14  7/26/18 09:05


650 MG


 


 Al Hydrox/Mg


 Hydrox/Simethicone


  (Maalox Max Susp)  15 ml  Q4H  PRN


 PO  7/25/18 21:15


 8/24/18 21:14   


 


 


 Magnesium


 Hydroxide


  (Milk Of


 Magnesia Susp)  30 ml  Q6H  PRN


 PO  7/25/18 21:15


 8/24/18 21:14   


 


 


 Polyethylene


  (Miralax Powder


 Packet)  17 gm  DAILY  PRN


 PO  7/25/18 21:15


 8/24/18 21:14   


 


 


 Ondansetron HCl


  (Zofran Inj)  4 mg  Q6H  PRN


 IV  7/25/18 21:15


 8/24/18 21:14   


 


 


 Aspirin


  (Ecotrin Tab)  81 mg  QAM


 PO  7/26/18 09:00


 8/25/18 08:59  7/26/18 07:36


81 MG


 


 Clopidogrel


 Bisulfate


  (plAVix TAB)  75 mg  DAILY


 PO  7/26/18 09:00


 8/25/18 08:59  7/26/18 07:36


75 MG


 


 Diltiazem HCl


  (TIAzac CAP)  360 mg  QAM


 PO  7/26/18 09:00


 8/25/18 08:59  7/26/18 07:37


360 MG


 


 Hydralazine HCl


  (Apresoline Tab)  25 mg  TID


 PO  7/26/18 09:00


 8/25/18 08:59  7/26/18 07:36


25 MG


 


 Lisinopril


  (Zestril Tab)  20 mg  AMHS


 PO  7/26/18 09:00


 8/25/18 08:59  7/26/18 08:17


20 MG


 


 Simvastatin


  (Zocor Tab)  20 mg  Q2D@0900


 PO  7/27/18 09:00


 8/26/18 08:59   


 


 


 Sodium Chloride  1,000 ml @ 


 80 mls/hr  [id number]


 IV  7/25/18 21:43


 8/24/18 21:42 Future Hold 7/26/18 10:57


80 MLS/HR


 


 Thiamine HCl


  (Vitamin B-1 Tab)  100 mg  QAM


 PO  7/25/18 23:35


 8/24/18 23:34  7/26/18 07:37


100 MG


 


 Lorazepam


  (Ativan Inj)  1 mg  ONE  PRN


 IV  7/25/18 21:45


     


 


 


 Heparin Sodium


  (Porcine)


  (Heparin Sq 5000


 Unit/0.5ml)  5,000 unit  Q12


 SQ  7/26/18 09:00


 8/25/18 08:59   


 


 


 Folic Acid


  (Folvite Tab)  1 mg  QAM


 PO  7/26/18 09:00


 8/25/18 08:59  7/26/18 07:36


1 MG











Family History





Cancer


Diabetes mellitus


Gallbladder disease


Hypertension


Lung disease


Negative for CKD / ESRD





Social History


Smoking Status:  Former Smoker


Drug Use:  none


Marital Status:  


Occupation:  retired


.  Remote h/o tobacco use





Review of Systems


Constitutional:  No fever


Cardiovascular:  No chest pain


Abdomen:  No pain, No nausea, No vomiting


A complete review of systems was performed.  Pertinent positives are noted 

above. All other systems are negative.





Physical Exam











  Date Time  Temp Pulse Resp B/P (MAP) Pulse Ox O2 Delivery O2 Flow Rate FiO2


 


7/26/18 09:07    165/71 (102)    


 


7/26/18 08:30      Room Air  


 


7/26/18 08:00      Room Air  


 


7/26/18 07:15 37.0 86 16 185/72 (109) 92 Room Air  


 


7/26/18 04:35  66  180/69 (106)    


 


7/26/18 00:35  97  178/94 (122)    


 


7/25/18 23:30 36.6 65 16 205/75 97 Room Air  


 


7/25/18 22:49  66 18 190/93 96   


 


7/25/18 21:16  68 18 187/87 97 Room Air  


 


7/25/18 19:58    178/92    


 


7/25/18 18:15 36.8 70 18 205/79 97 Room Air  








General Appearance:  no apparent distress


Head:  atraumatic


Eyes:  PERRL, EOMI


ENT:  + pertinent finding (dry mucous membranes)


Neck:  no adenopathy


Respiratory/Chest:  lungs clear, no respiratory distress


Cardiovascular:  regular rate, rhythm


Abdomen/GI:  normal bowel sounds, non tender, soft


Extremities/Musculoskelatal:  no pedal edema, + pertinent finding (poor skin 

turgor)


Neurologic/Psych:  alert, oriented x 3





Laboratory Results





Last 24 Hours








Test


  7/25/18


18:42 7/25/18


19:11 7/26/18


01:05 7/26/18


07:15


 


White Blood Count 6.77 K/uL    


 


Red Blood Count 4.31 M/uL    


 


Hemoglobin 12.8 g/dL    


 


Hematocrit 37.4 %    


 


Mean Corpuscular Volume 86.8 fL    


 


Mean Corpuscular Hemoglobin 29.7 pg    


 


Mean Corpuscular Hemoglobin


Concent 34.2 g/dl 


  


  


  


 


 


Platelet Count 279 K/uL    


 


Mean Platelet Volume 9.1 fL    


 


Neutrophils (%) (Auto) 54.1 %    


 


Lymphocytes (%) (Auto) 37.1 %    


 


Monocytes (%) (Auto) 7.7 %    


 


Eosinophils (%) (Auto) 0.9 %    


 


Basophils (%) (Auto) 0.1 %    


 


Neutrophils # (Auto) 3.66 K/uL    


 


Lymphocytes # (Auto) 2.51 K/uL    


 


Monocytes # (Auto) 0.52 K/uL    


 


Eosinophils # (Auto) 0.06 K/uL    


 


Basophils # (Auto) 0.01 K/uL    


 


RDW Standard Deviation 40.7 fL    


 


RDW Coefficient of Variation 12.6 %    


 


Immature Granulocyte % (Auto) 0.1 %    


 


Immature Granulocyte # (Auto) 0.01 K/uL    


 


Sodium Level 123 mmol/L    128 mmol/L 


 


Potassium Level 3.5 mmol/L    4.0 mmol/L 


 


Chloride Level 89 mmol/L    97 mmol/L 


 


Carbon Dioxide Level 23 mmol/L    24 mmol/L 


 


Anion Gap 11.0 mmol/L    8.0 mmol/L 


 


Blood Urea Nitrogen 13 mg/dl    12 mg/dl 


 


Creatinine 1.03 mg/dl    0.86 mg/dl 


 


Est Creatinine Clear Calc


Drug Dose 29.7 ml/min 


  


  


  35.6 ml/min 


 


 


Estimated GFR (


American) 59.5 


  


  


  73.9 


 


 


Estimated GFR (Non-


American 51.3 


  


  


  63.8 


 


 


BUN/Creatinine Ratio 12.6    14.3 


 


Random Glucose 93 mg/dl    90 mg/dl 


 


Osmolality 251 mOsm/kg    


 


Calcium Level 8.9 mg/dl    8.7 mg/dl 


 


Total Bilirubin 0.8 mg/dl    


 


Direct Bilirubin 0.2 mg/dl    


 


Aspartate Amino Transf


(AST/SGOT) 22 U/L 


  


  


  


 


 


Alanine Aminotransferase


(ALT/SGPT) 19 U/L 


  


  


  


 


 


Alkaline Phosphatase 53 U/L    


 


Total Protein 7.4 gm/dl    


 


Albumin 4.3 gm/dl    


 


Ethyl Alcohol mg/dL  < 3.0 mg/dl   


 


Urine Osmolality   178 mOms/kg  


 


Urine Random Sodium   74 mEq/L  


 


Prothrombin Time    10.5 SECONDS 


 


Prothromb Time International


Ratio 


  


  


  1.0 


 


 


Thyroid Stimulating Hormone


(TSH) 


  


  


  1.140 uIu/ml 


 


 


Cortisol AM Sample    21.64 mcg/dl 











Impression





(1) Hyponatremia


(2) Hypertension


(3) Dehydration


Hypoosmolar hyponatremia in a patient who is clinically volume contracted.  TSH

, cortisol and kidney function are normal.  Uosm is < 200.  Clinically suspect 

that hyponatremia is on the basis of dehydration and low solute intake.  Serum 

sodium has improved from 125 to 128 mmol/L with 0.9NS administration.  Patient 

has dementia and adherence to prescribed antihypertensive regimen is in 

question.  Will increase Lisinopril back to 20 mg BID and continue Diltiazem 

and Hydralazine as previously prescribed.





Recommendations


HYPONATREMIA:


-- Continue gentle hydration w/ 0.9NS at 80 cc/hr IV


-- Monitor serial PRP





HYPERTENSION:


-- Increase Lisinopril back to 20 mg po BID


-- Will order renal US w/ renal artery doppler

## 2018-07-26 NOTE — DIAGNOSTIC IMAGING REPORT
EXAMINATION: RENAL ULTRASOUND



CLINICAL HISTORY: Hypertension RENAL MASS



COMPARISON STUDY:  CT scan dated 5/1/2012



FINDINGS: The right kidney measures 13.1 cm. The left kidney measures 9.3 cm. 

There is no evidence of hydronephrosis.  There are multiple large bilateral

renal cysts. The largest on the right is a 7.3 cm cyst. This contains a single

thin septation. The largest on the left is a 2.8 cm cyst.



No bladder abnormalities are visualized.  Bilateral ureteral jets were

visualized.

                 

IMPRESSION : Large bilateral renal cysts. No evidence of obstruction.







Electronically signed by:  Gallito Ribera M.D.

7/26/2018 3:59 PM



Dictated Date/Time:  7/26/2018 3:56 PM

## 2018-07-26 NOTE — FAMILY MEDICINE PROGRESS NOTE
Progress Note


Date of Service


Jul 26, 2018.





Subjective


Feeling well this AM


Mentally at baseline per daughter in law at bedside


MEHDI overnight


Dr. Purdy about to see her


reports slight headache


Tolerating PO


Says she got up to bathroom with no lightheadedness, vertigo or dizziness





ROS


See HPI for pertinent positives and negatives. Otherwise denies new headache, 

vision change, chest pain, dyspnea, abdominal pain, loose or bloody stools, 

dysuria, or numbness tingling in extremities.





Medications





Current Inpatient Medications








 Medications


  (Trade)  Dose


 Ordered  Sig/Queenie


 Route  Start Time


 Stop Time Status Last Admin


Dose Admin


 


 Acetaminophen


  (Tylenol Tab)  650 mg  Q4H  PRN


 PO  7/25/18 21:15


 8/24/18 21:14  7/26/18 09:05


650 MG


 


 Al Hydrox/Mg


 Hydrox/Simethicone


  (Maalox Max Susp)  15 ml  Q4H  PRN


 PO  7/25/18 21:15


 8/24/18 21:14   


 


 


 Magnesium


 Hydroxide


  (Milk Of


 Magnesia Susp)  30 ml  Q6H  PRN


 PO  7/25/18 21:15


 8/24/18 21:14   


 


 


 Polyethylene


  (Miralax Powder


 Packet)  17 gm  DAILY  PRN


 PO  7/25/18 21:15


 8/24/18 21:14   


 


 


 Ondansetron HCl


  (Zofran Inj)  4 mg  Q6H  PRN


 IV  7/25/18 21:15


 8/24/18 21:14   


 


 


 Aspirin


  (Ecotrin Tab)  81 mg  QAM


 PO  7/26/18 09:00


 8/25/18 08:59  7/26/18 07:36


81 MG


 


 Clopidogrel


 Bisulfate


  (plAVix TAB)  75 mg  DAILY


 PO  7/26/18 09:00


 8/25/18 08:59  7/26/18 07:36


75 MG


 


 Diltiazem HCl


  (TIAzac CAP)  360 mg  QAM


 PO  7/26/18 09:00


 8/25/18 08:59  7/26/18 07:37


360 MG


 


 Hydralazine HCl


  (Apresoline Tab)  25 mg  TID


 PO  7/26/18 09:00


 8/25/18 08:59  7/26/18 07:36


25 MG


 


 Lisinopril


  (Zestril Tab)  20 mg  AMHS


 PO  7/26/18 09:00


 8/25/18 08:59  7/26/18 08:17


20 MG


 


 Simvastatin


  (Zocor Tab)  20 mg  Q2D@0900


 PO  7/27/18 09:00


 8/26/18 08:59   


 


 


 Sodium Chloride  1,000 ml @ 


 80 mls/hr  [id number]


 IV  7/25/18 21:43


 8/24/18 21:42  7/26/18 00:37


80 MLS/HR


 


 Thiamine HCl


  (Vitamin B-1 Tab)  100 mg  QAM


 PO  7/25/18 23:35


 8/24/18 23:34  7/26/18 07:37


100 MG


 


 Lorazepam


  (Ativan Inj)  1 mg  ONE  PRN


 IV  7/25/18 21:45


     


 


 


 Heparin Sodium


  (Porcine)


  (Heparin Sq 5000


 Unit/0.5ml)  5,000 unit  Q12


 SQ  7/26/18 09:00


 8/25/18 08:59   


 


 


 Folic Acid


  (Folvite Tab)  1 mg  QAM


 PO  7/26/18 09:00


 8/25/18 08:59  7/26/18 07:36


1 MG











Objective


Vital Signs











  Date Time  Temp Pulse Resp B/P (MAP) Pulse Ox O2 Delivery O2 Flow Rate FiO2


 


7/26/18 09:07    165/71 (102)    


 


7/26/18 08:30      Room Air  


 


7/26/18 08:00      Room Air  


 


7/26/18 07:15 37.0 86 16 185/72 (109) 92 Room Air  


 


7/26/18 04:35  66  180/69 (106)    


 


7/26/18 00:35  97  178/94 (122)    


 


7/25/18 23:30 36.6 65 16 205/75 97 Room Air  


 


7/25/18 22:49  66 18 190/93 96   


 


7/25/18 21:16  68 18 187/87 97 Room Air  


 


7/25/18 19:58    178/92    


 


7/25/18 18:15 36.8 70 18 205/79 97 Room Air  











Physical Exam


Notes:


GENERAL: Awake, alert to self not to place or time. No distress


HENT: Normocephalic, atraumatic. 


EYES: Normal conjunctiva. Sclera non-icteric. EOMI


NECK: Supple. Full range of motion.


RESPIRATORY: Clear to auscultation.


CARDIAC: Regular rate, normal rhythm. Extremities warm and well perfused. 

Pulses equal.


ABDOMEN: Soft, non-distended. No tenderness to palpation. No rebound or 

guarding. No masses.


LOWER EXTREMITIES: Calves are equal size bilaterally and non-tender. No edema. 

No discoloration. 


NEURO: No sensorymotor deficits noted.  CN II-XII in tact. Did not assess gait. 


SKIN: No rash or jaundice noted.





Laboratory Results


7/25/18 18:42








Red Blood Count 4.31, Mean Corpuscular Volume 86.8, Mean Corpuscular Hemoglobin 

29.7, Mean Corpuscular Hemoglobin Concent 34.2, Mean Platelet Volume 9.1, 

Neutrophils (%) (Auto) 54.1, Lymphocytes (%) (Auto) 37.1, Monocytes (%) (Auto) 

7.7, Eosinophils (%) (Auto) 0.9, Basophils (%) (Auto) 0.1, Neutrophils # (Auto) 

3.66, Lymphocytes # (Auto) 2.51, Monocytes # (Auto) 0.52, Eosinophils # (Auto) 

0.06, Basophils # (Auto) 0.01





7/26/18 07:15

















Test


  7/25/18


18:42 7/25/18


19:11 7/26/18


01:05 7/26/18


07:15


 


White Blood Count


  6.77 K/uL


(4.8-10.8) 


  


  


 


 


Red Blood Count


  4.31 M/uL


(4.2-5.4) 


  


  


 


 


Hemoglobin


  12.8 g/dL


(12.0-16.0) 


  


  


 


 


Hematocrit 37.4 % (37-47)    


 


Mean Corpuscular Volume


  86.8 fL


() 


  


  


 


 


Mean Corpuscular Hemoglobin


  29.7 pg


(25-34) 


  


  


 


 


Mean Corpuscular Hemoglobin


Concent 34.2 g/dl


(32-36) 


  


  


 


 


Platelet Count


  279 K/uL


(130-400) 


  


  


 


 


Mean Platelet Volume


  9.1 fL


(7.4-10.4) 


  


  


 


 


Neutrophils (%) (Auto) 54.1 %    


 


Lymphocytes (%) (Auto) 37.1 %    


 


Monocytes (%) (Auto) 7.7 %    


 


Eosinophils (%) (Auto) 0.9 %    


 


Basophils (%) (Auto) 0.1 %    


 


Neutrophils # (Auto)


  3.66 K/uL


(1.4-6.5) 


  


  


 


 


Lymphocytes # (Auto)


  2.51 K/uL


(1.2-3.4) 


  


  


 


 


Monocytes # (Auto)


  0.52 K/uL


(0.11-0.59) 


  


  


 


 


Eosinophils # (Auto)


  0.06 K/uL


(0-0.5) 


  


  


 


 


Basophils # (Auto)


  0.01 K/uL


(0-0.2) 


  


  


 


 


RDW Standard Deviation


  40.7 fL


(36.4-46.3) 


  


  


 


 


RDW Coefficient of Variation


  12.6 %


(11.5-14.5) 


  


  


 


 


Immature Granulocyte % (Auto) 0.1 %    


 


Immature Granulocyte # (Auto)


  0.01 K/uL


(0.00-0.02) 


  


  


 


 


Osmolality


  251 mOsm/kg


(280-300) 


  


  


 


 


Total Bilirubin


  0.8 mg/dl


(0.2-1) 


  


  


 


 


Direct Bilirubin


  0.2 mg/dl


(0-0.2) 


  


  


 


 


Aspartate Amino Transf


(AST/SGOT) 22 U/L (15-37) 


  


  


  


 


 


Alanine Aminotransferase


(ALT/SGPT) 19 U/L (12-78) 


  


  


  


 


 


Alkaline Phosphatase


  53 U/L


() 


  


  


 


 


Total Protein


  7.4 gm/dl


(6.4-8.2) 


  


  


 


 


Albumin


  4.3 gm/dl


(3.4-5.0) 


  


  


 


 


Ethyl Alcohol mg/dL


  


  < 3.0 mg/dl


(0-3) 


  


 


 


Urine Osmolality


  


  


  178 mOms/kg


(500-800) 


 


 


Urine Random Sodium   74 mEq/L  


 


Prothrombin Time


  


  


  


  10.5 SECONDS


(9.0-12.0)


 


Prothromb Time International


Ratio 


  


  


  1.0 (0.9-1.1) 


 


 


Anion Gap


  


  


  


  8.0 mmol/L


(3-11)


 


Est Creatinine Clear Calc


Drug Dose 


  


  


  35.6 ml/min 


 


 


Estimated GFR (


American) 


  


  


  73.9 


 


 


Estimated GFR (Non-


American 


  


  


  63.8 


 


 


BUN/Creatinine Ratio    14.3 (10-20) 


 


Calcium Level


  


  


  


  8.7 mg/dl


(8.5-10.1)


 


Thyroid Stimulating Hormone


(TSH) 


  


  


  1.140 uIu/ml


(0.300-4.500)


 


Cortisol AM Sample


  


  


  


  21.64 mcg/dl


(4.30-22.40)











Assessment and Plan


82F here for headache found to have SBP >200 and electrolyte abnormality





PMHx: dementia, hx of L hemispheric CVA, TIAs, bilateral carotid artery 

stenosis s/p b/l carotid endarterectomy, HTN, HLD, and breast cancer





Headache, with elevated blood pressure, versus hypertensive emergency


-Symptoms may be explained by hyponatremia however given elevated blood 

pressures and given baseline memory problems, elevated blood pressures may be 

simply due to noncompliance


-Nonetheless, home meds restarted, systolic stable 160s - lisinopril 20mg BID, 

diltiazem 360mg qam, hydralazine 25mg TID


- headache mostly resolved with tylenol and stabilized BP





Hyponatremia


- Na 123 on admission - - improving to 128 with just NSS half liter bolus 

overnight, with maintenance @80ml/hr.  Goal rate of increase in sodium per day 

is about 10-12 points.


- diffl includes renal def (followed by Dr. Ferrara outpatient, last visit 

within 6 months no new changes in meds), Thyroid (TSH normal), adrenal insuff (

am cortisol normal) and beer potomania.  On review of outside records, family 

medicine doctor noted a conversation with patient's daughter regarding history 

of alcohol use.  Patient currently denies use.


-Given low serum osmolality kidneys are appropriately putting out dilute urine.


-Appreciate nephrology recommendations





H/o alcohol abuse


- AWSS running - no ativan needed so far


- Thiamine, folic acid on


- follow





HLD/CAD 


- Continue aspirin and clopidogrel 75mg daily


- Continue simvastatin 20mg q2d renally dosed





FEN/GI: Regular diet.  Normal saline running at 80 mL/h


DVT prop: Heparin 5000 units every 12 hours SQ


CODE STATUS : full code


Dispo: MedSurg.  Discharge eval.





Resident Physician Supervision Note:





I interviewed and examined the patient. Discussed with Dr. Vega and agree 

with findings and plan as documented in the note. Any exceptions or 

clarifications are listed here: None





Documented By:  Riley Venegas


feeling better


notes she doesn't drink 


family notes she's not drank for about 3yrs, but questionable taking care of 

herself and questionable eating/PO intake at home


vitals noted nad breathing unlabored no pallor or icterus





hyponatremia - low solute state, saline, follow





headache, possible HTN urgency - improving





otherwise as above


Continued [facility] stay due to:  multiple IV medications needed





Resident Tracking


Resident Involvement:  Resident Care Provided


Care Provided:  Adult Hospital Medicine

## 2018-07-27 VITALS
TEMPERATURE: 98.06 F | HEART RATE: 53 BPM | OXYGEN SATURATION: 97 % | DIASTOLIC BLOOD PRESSURE: 74 MMHG | SYSTOLIC BLOOD PRESSURE: 172 MMHG

## 2018-07-27 VITALS
OXYGEN SATURATION: 96 % | SYSTOLIC BLOOD PRESSURE: 139 MMHG | HEART RATE: 69 BPM | DIASTOLIC BLOOD PRESSURE: 83 MMHG | TEMPERATURE: 98.06 F

## 2018-07-27 VITALS
TEMPERATURE: 97.88 F | HEART RATE: 74 BPM | DIASTOLIC BLOOD PRESSURE: 73 MMHG | OXYGEN SATURATION: 99 % | SYSTOLIC BLOOD PRESSURE: 134 MMHG

## 2018-07-27 LAB
BUN SERPL-MCNC: 21 MG/DL (ref 7–18)
CALCIUM SERPL-MCNC: 8.6 MG/DL (ref 8.5–10.1)
CO2 SERPL-SCNC: 22 MMOL/L (ref 21–32)
CREAT SERPL-MCNC: 1.06 MG/DL (ref 0.6–1.2)
GLUCOSE SERPL-MCNC: 91 MG/DL (ref 70–99)
POTASSIUM SERPL-SCNC: 4.7 MMOL/L (ref 3.5–5.1)
SODIUM SERPL-SCNC: 134 MMOL/L (ref 136–145)

## 2018-07-27 RX ADMIN — HEPARIN SODIUM SCH UNIT: 10000 INJECTION, SOLUTION INTRAVENOUS; SUBCUTANEOUS at 08:39

## 2018-07-27 RX ADMIN — LISINOPRIL SCH MG: 20 TABLET ORAL at 08:41

## 2018-07-27 RX ADMIN — CLOPIDOGREL BISULFATE SCH MG: 75 TABLET, FILM COATED ORAL at 08:40

## 2018-07-27 RX ADMIN — Medication SCH MG: at 08:40

## 2018-07-27 RX ADMIN — DILTIAZEM HYDROCHLORIDE SCH MG: 180 CAPSULE, EXTENDED RELEASE ORAL at 08:40

## 2018-07-27 RX ADMIN — SODIUM CHLORIDE SCH MLS/HR: 900 INJECTION, SOLUTION INTRAVENOUS at 02:53

## 2018-07-27 NOTE — DISCHARGE INSTRUCTIONS
Discharge Instructions


Date of Service


Jul 27, 2018.





Admission


Reason for Admission:  Hyponatremia





Discharge


Discharge Diagnosis / Problem:  low sodium (see below)





Discharge Goals


Goal(s):  Diagnostic testing, Therapeutic intervention





Activity Recommendations


Activity Limitations:  resume your previous activity





.





Instructions / Follow-Up


Instructions / Follow-Up


a) hyponatremia (low sodium)


-your sodium levels were quite low, while it's possible that the high blood 

pressure was part of the cause of your situation, it's also actually more 

likely that the low sodium was the whole culprit, and the high blood pressure 

and headache were just symptoms from feeling "lousy" from the sodium levels 

being off


-on review, it appeared that you were in a "low solute state" - meaning that 

the sodium was low because your body was depleted.  since you're not on any 

diuretics that could do this, it's most likely that this came about because 

regular losses of salt and water (sweat, breathing etc) was more than what you 

were taking in


   -make sure you're getting enough to eat.  often as we age, our natural 

hunger and thirst hormones fade and we don't necessarily feel as hungry or 

thirsty as our body should


   to protect you against that, it's often helpful to write down what you eat 

each day so that you (and your family) can look it over to see that you're 

getting enough


-we'll also want to follow your levels closely for the near future - we'll want 

you to have labwork (BMP) drawn once a week for the next 2 weeks, then after 

that how often we'll need to check will depend on what your levels have been 

doing and how you're feeling





b) elevated blood pressure


-while your pressure was up enough that it could've been the cause of the 

headache, it's actually more likely (as noted above) that the high blood 

pressure and headache were part of the picture of feeling lousy from the low 

sodium. (not that low sodium directly can raise your pressure - but it can 

cause a headache, and the headache as well as just overall feeling bad can 

cause blood pressures to rise)


-because your numbers do look too high overall, we've increased your 

hydralazine to 50mg three times a day - use caution with standing up quickly - 

sometimes after a dose adjustment with a medication like this, you can feel 

more easily lightheaded


-Dr Purdy did an ultrasound of your kidneys - the main thing of significance 

he found was that the artery to your RIGHT kidney was about 60-70% blocked - 

this will be something to keep an eye on over time.





Current Hospital Diet


Patient's current hospital diet: AHA Diet (Heart Healthy)





Discharge Diet


Recommended Diet:  Regular Diet





Pending Studies


Studies pending at discharge:  no





Medical Emergencies








.


Who to Call and When:





Medical Emergencies:  If at any time you feel your situation is an emergency, 

please call 911 immediately.





.





Non-Emergent Contact


Non-Emergency issues call your:  Primary Care Provider, Nephrologist





.


.








"Provider Documentation" section prepared by Riley Venegas.








.

## 2018-07-27 NOTE — NEPHROLOGY PROGRESS NOTE
Nephrology Progress Note


Date of Service


Jul 27, 2018.





Chief Complaint


Evaluation of hypertension and hyponatremia





Subjective


Ms. Vogt was seen & examined in her hospital room this morning.  Her son was 

present at bedside.  Patient was oriented to self, place and month.  She denied 

HA, visual change or focal weakness.





Review of Systems


Constitutional:  No fever


Cardiovascular:  No chest pain


Respiratory:  No dyspnea at rest


Abdomen:  No pain, No nausea, No vomiting


Extremities:  No leg edema


A complete review of systems was performed.  Pertinent positives are noted 

above. All other systems are negative.





Vital Signs





Last 8 Hrs








  Date Time  Temp Pulse Resp B/P (MAP) Pulse Ox O2 Delivery O2 Flow Rate FiO2


 


7/27/18 07:07 36.7 53 16 172/74 (106) 97 Room Air  











Last Recorded Weight


Weight (Kilograms):  49.300





Physical Exam


General Appearance:  no apparent distress


Head:  atraumatic


Eyes:  PERRL


Neck:  no adenopathy


Respiratory/Chest:  lungs clear, no respiratory distress


Cardiovascular:  regular rate, rhythm


Abdomen/GI:  normal bowel sounds, non tender, soft


Extremities/Musculoskelatal:  no calf tenderness, no pedal edema


Neurologic/Psych:  alert, oriented x 3





Family History





Cancer


Diabetes mellitus


Gallbladder disease


Hypertension


Lung disease


Negative for CKD / ESRD





Social History


Smoking Status:  Former smoker


Smokeless Tobacco Use:  No


Alcohol Use:  socially


Drug Use:  none


Marital Status:  


Occupation:  retired


.  Remote h/o tobacco use





Laboratory Results


Past 24 Hours


7/26/18 15:51








7/27/18 07:18

















Test


  7/26/18


12:25 7/26/18


15:51 7/27/18


07:18


 


Urine Osmolality


  247 mOms/kg


(500-800) 


  


 


 


Urine Random Potassium 15.4 mEq/L   


 


Anion Gap


  


  9.0 mmol/L


(3-11) 7.0 mmol/L


(3-11)


 


Est Creatinine Clear Calc


Drug Dose 


  26.2 ml/min 


  28.9 ml/min 


 


 


Estimated GFR (


American) 


  51.0 


  57.4 


 


 


Estimated GFR (Non-


American 


  44.0 


  49.6 


 


 


BUN/Creatinine Ratio  19.2 (10-20)  19.6 (10-20) 


 


Calcium Level


  


  9.1 mg/dl


(8.5-10.1) 8.6 mg/dl


(8.5-10.1)


 


Chemistry Specimen Hemolysis    











Allergies


Coded Allergies:  


     Penicillins (Verified  Allergy, Intermediate, "swelling" (pt does not 

remember where), 5/13/18)





Medications





Current Inpatient Medications








 Medications


  (Trade)  Dose


 Ordered  Sig/Queenie


 Route  Start Time


 Stop Time Status Last Admin


Dose Admin


 


 Acetaminophen


  (Tylenol Tab)  650 mg  Q4H  PRN


 PO  7/25/18 21:15


 8/24/18 21:14  7/26/18 09:05


650 MG


 


 Al Hydrox/Mg


 Hydrox/Simethicone


  (Maalox Max Susp)  15 ml  Q4H  PRN


 PO  7/25/18 21:15


 8/24/18 21:14   


 


 


 Magnesium


 Hydroxide


  (Milk Of


 Magnesia Susp)  30 ml  Q6H  PRN


 PO  7/25/18 21:15


 8/24/18 21:14   


 


 


 Polyethylene


  (Miralax Powder


 Packet)  17 gm  DAILY  PRN


 PO  7/25/18 21:15


 8/24/18 21:14   


 


 


 Ondansetron HCl


  (Zofran Inj)  4 mg  Q6H  PRN


 IV  7/25/18 21:15


 8/24/18 21:14   


 


 


 Aspirin


  (Ecotrin Tab)  81 mg  QAM


 PO  7/26/18 09:00


 8/25/18 08:59  7/27/18 08:40


81 MG


 


 Clopidogrel


 Bisulfate


  (plAVix TAB)  75 mg  DAILY


 PO  7/26/18 09:00


 8/25/18 08:59  7/27/18 08:40


75 MG


 


 Diltiazem HCl


  (TIAzac CAP)  360 mg  QAM


 PO  7/26/18 09:00


 8/25/18 08:59  7/27/18 08:40


360 MG


 


 Simvastatin


  (Zocor Tab)  20 mg  Q2D@0900


 PO  7/27/18 09:00


 8/26/18 08:59  7/27/18 08:41


20 MG


 


 Thiamine HCl


  (Vitamin B-1 Tab)  100 mg  QAM


 PO  7/25/18 23:35


 8/24/18 23:34  7/27/18 08:40


100 MG


 


 Lorazepam


  (Ativan Inj)  1 mg  ONE  PRN


 IV  7/25/18 21:45


     


 


 


 Heparin Sodium


  (Porcine)


  (Heparin Sq 5000


 Unit/0.5ml)  5,000 unit  Q12


 SQ  7/26/18 09:00


 8/25/18 08:59  7/27/18 08:39


5,000 UNIT


 


 Folic Acid


  (Folvite Tab)  1 mg  QAM


 PO  7/26/18 09:00


 8/25/18 08:59  7/27/18 08:40


1 MG


 


 Lisinopril


  (Zestril Tab)  20 mg  BID


 PO  7/26/18 21:00


 8/25/18 08:59  7/27/18 08:41


20 MG


 


 Hydralazine HCl


  (Apresoline Tab)  50 mg  TID


 PO  7/27/18 14:00


 8/26/18 13:59   


 











Impression





(1) Hyponatremia


(2) Hypertension


(3) Dehydration


Hypoosmolar hyponatremia in a patient who is clinically volume contracted.  TSH

, cortisol and kidney function are normal.  Uosm is < 200.  Clinically suspect 

that hyponatremia is on the basis of dehydration and low solute intake.  Serum 

sodium has improved from 125 to 128 mmol/L with 0.9NS administration.  Patient 

has dementia and adherence to prescribed antihypertensive regimen is in 

question.  Will increase Lisinopril back to 20 mg BID and continue Diltiazem 

and Hydralazine as previously prescribed.





Recommendations


HYPONATREMIA:


-- Corrected


-- Heplock IV





HYPERTENSION:


-- Blood pressure is mildly elevated.  Will increase Hydralazine to 50 mg po TID


-- Renal US report reviewed today:  L kidney 9.3 cm, R kidney 13.1.  Mild to 

moderate R stenosis.  


-- HTN is not likely on the basis of CARA given only unilateral moderate stenosis





If discharge is anticipated please have patient follow up in the [facility] 

Nephrology office w/ Dr. Ferrara in 1 - 2 weeks.

## 2018-07-27 NOTE — DISCHARGE SUMMARY
Discharge Summary


Date of Service


Jul 27, 2018.





Discharge Summary


Admission Date:


Jul 25, 2018 at 21:25


Discharge Date:  Jul 27, 2018


Discharge Disposition:  Home


Principal Diagnosis:  hyponatremia


Problems/Secondary Diagnoses:


(1) Hypertension


Status: Chronic  








Immunizations:  


   Have You Had Influenza Vaccine:  Yes


   Influenza Vaccine Date:  Sep 7, 2011


   History of Tetanus Vaccine?:  Yes


   History of Pneumococcal:  Yes


   Pneumococcal Date:  Oct 29, 2010


   History of Hepatitis B Vaccine:  Unknown


Procedures:


Last Resulted CBC


7/25/18 18:42








Red Blood Count 4.31, Mean Corpuscular Volume 86.8, Mean Corpuscular Hemoglobin 

29.7, Mean Corpuscular Hemoglobin Concent 34.2, Mean Platelet Volume 9.1, 

Neutrophils (%) (Auto) 54.1, Lymphocytes (%) (Auto) 37.1, Monocytes (%) (Auto) 

7.7, Eosinophils (%) (Auto) 0.9, Basophils (%) (Auto) 0.1, Neutrophils # (Auto) 

3.66, Lymphocytes # (Auto) 2.51, Monocytes # (Auto) 0.52, Eosinophils # (Auto) 

0.06, Basophils # (Auto) 0.01





Last Resulted BMP


7/27/18 07:18








EXAMINATION: RENAL ULTRASOUND





CLINICAL HISTORY: Hypertension RENAL MASS





COMPARISON STUDY:  CT scan dated 5/1/2012





FINDINGS: The right kidney measures 13.1 cm. The left kidney measures 9.3 cm. 


There is no evidence of hydronephrosis.  There are multiple large bilateral


renal cysts. The largest on the right is a 7.3 cm cyst. This contains a single


thin septation. The largest on the left is a 2.8 cm cyst.





No bladder abnormalities are visualized.  Bilateral ureteral jets were


visualized.


                 


IMPRESSION : Large bilateral renal cysts. No evidence of obstruction.











Electronically signed by:  Gallito Ribera M.D.


7/26/2018 3:59 PM


----------------------------





DUPLEX RENAL ARTERY





CLINICAL HISTORY: HTN hypertension





TECHNIQUE: Renal arterial Doppler





COMPARISON STUDY:  7/26/2018





FINDINGS: Somewhat limited study as the patient had difficulty in cooperating


with the exam. Bilateral renal cysts are again noted similar compared to the


prior study of 7/26/2018.





There is a slight increase in resistive indices of both kidneys. The proximal


right renal artery demonstrates a significant increase in velocities to


approximately 220 cm/s.





The remaining arterial structures of the kidneys bilaterally are unremarkable


within limitations discussed above.





IMPRESSION:  


1. Moderate/significant stenosis proximal right renal artery estimated at


60-70%.








2. Mild increase in resistive indices bilaterally indicative of chronic small


vessel change of the kidneys.


3. Bilateral renal cysts which have been described previously. 


4. Compromised exam due to patient's difficulty in cooperating as well as the


presence of the renal cyst.














The above report was generated using voice recognition software.  It may contain


grammatical, syntax or spelling errors.











Electronically signed by:  Steven Harris M.D


Consultations:


Nephrology:





Impression





(1) Hyponatremia


(2) Hypertension


(3) Dehydration


Hypoosmolar hyponatremia in a patient who is clinically volume contracted.  TSH

, cortisol and kidney function are normal.  Uosm is < 200.  Clinically suspect 

that hyponatremia is on the basis of dehydration and low solute intake.  Serum 

sodium has improved from 125 to 128 mmol/L with 0.9NS administration.  Patient 

has dementia and adherence to prescribed antihypertensive regimen is in 

question.  Will increase Lisinopril back to 20 mg BID and continue Diltiazem 

and Hydralazine as previously prescribed.





Recommendations


HYPONATREMIA:


-- Corrected


-- Heplock IV





HYPERTENSION:


-- Blood pressure is mildly elevated.  Will increase Hydralazine to 50 mg po TID


-- Renal US report reviewed today:  L kidney 9.3 cm, R kidney 13.1.  Mild to 

moderate R stenosis.  


-- HTN is not likely on the basis of CARA given only unilateral moderate stenosis





If discharge is anticipated please have patient follow up in the [facility] 

Nephrology office w/ Dr. Ferrara in 1 - 2 weeks.











<Electronically signed by Trae Purdy M.D.>





  Signed:  07/27/18 0914


  Signed:  





The status of this report is Signed


* If report status is Draft, the document has not been finalized by the 

responsible provider.  











Addendum:  07/27/18 0921





Addendum: Trae Purdy M.D. on 7/27/18 @ 09:21


Addendum Section


I have contacted the Nephrology office staff and asked them to schedule follow 

up appointment and outpatient lab draw





Medication Reconciliation


New Medications:  


Hydralazine Hcl (Apresoline) 25 Mg Tab


50 MG PO TID, #90 TAB





 


Continued Medications:  


Aspirin (Aspirin Ec) 81 Mg Tab


81 MG PO QAM





Clopidogrel Bisulfate (Clopidogrel) 75 Mg Tab


75 MG PO DAILY





Diltiazem Hcl Extended Release (Tiazac 360 Mg) 360 Mg Cap


360 MG PO QAM





Lisinopril (Lisinopril) 20 Mg Tab


20 MG PO AMHS





Simvastatin (Simvastatin) 20 Mg Tab


20 MG PO Q2D





 


Discontinued Medications:  


Hydralazine Hcl (Apresoline) 25 Mg Tab


25 MG PO TID











Discharge Exam


Physical Exam:  


   General Appearance:  no apparent distress


   Eyes:  EOMI


   ENT:  hearing grossly normal


   Neck:  trachea midline


   Respiratory/Chest:  no respiratory distress, no accessory muscle use


   Extremities:  normal inspection


   Neurologic/Psychiatric:  CNs II-XII nml as tested, alert, normal mood/affect


   Skin:  normal color, warm/dry





Hospital Course


82F here for headache found to have SBP >200 and electrolyte abnormality





PMHx: dementia, hx of L hemispheric CVA, TIAs, bilateral carotid artery 

stenosis s/p b/l carotid endarterectomy, HTN, HLD, and breast cancer





Headache, with elevated blood pressure, versus hypertensive emergency


-Symptoms may be explained by hyponatremia however given elevated blood 

pressures and given baseline memory problems, elevated blood pressures may be 

simply due to noncompliance


-renal artery doppler as above.  BP management as with meds as above - notably 

hydralazine increased to 50mg TID


- headache mostly resolved with tylenol and stabilized BP





Hyponatremia


- Na 123 on admission - - improving and stable for home


-appearing to be a low solute state - to follow PO intake more closely at home, 

and follow BMP closely for at least next several weeks


-Appreciate nephrology recommendations





H/o alcohol abuse


- initial concern that this might play a role - but after further review and 

confirmation with family - no active drinking for years





HLD/CAD 


- Continue aspirin and clopidogrel 75mg daily


- Continue simvastatin 20mg q2d renally dosed





stable for home


family notes that they'll be looking into [facility] due to dementia, but that they 

firmly believe that right now she is stable in her home environment


Total Time Spent:  Less than 30 minutes


This includes examination of the patient, discharge planning, medication 

reconciliation, and communication with other providers.





Discharge Instructions


Please refer to the electronic Patient Visit Report (Discharge Instructions) 

for additional information.





Follow-Up


PCP next week


weekly BMP x 2 weeks then as clinically warranted





Additional Copies To


Burak Monique III, CRNP; Jo Ann Ferrara MD

## 2018-07-31 ENCOUNTER — HOSPITAL ENCOUNTER (OUTPATIENT)
Dept: HOSPITAL 45 - C.LABSPEC | Age: 83
Discharge: HOME | End: 2018-07-31
Attending: NURSE PRACTITIONER
Payer: COMMERCIAL

## 2018-07-31 DIAGNOSIS — R41.3: ICD-10-CM

## 2018-07-31 DIAGNOSIS — E87.1: Primary | ICD-10-CM

## 2018-08-13 ENCOUNTER — HOSPITAL ENCOUNTER (OUTPATIENT)
Dept: HOSPITAL 45 - C.LAB | Age: 83
Discharge: HOME | End: 2018-08-13
Attending: INTERNAL MEDICINE
Payer: COMMERCIAL

## 2018-08-13 DIAGNOSIS — R01.1: Primary | ICD-10-CM

## 2018-08-13 DIAGNOSIS — F10.21: ICD-10-CM

## 2018-08-13 LAB
ALBUMIN SERPL-MCNC: 4.4 GM/DL (ref 3.4–5)
BUN SERPL-MCNC: 11 MG/DL (ref 7–18)
CALCIUM SERPL-MCNC: 8.8 MG/DL (ref 8.5–10.1)
CO2 SERPL-SCNC: 23 MMOL/L (ref 21–32)
CREAT SERPL-MCNC: 1.11 MG/DL (ref 0.6–1.2)
EOSINOPHIL NFR BLD AUTO: 348 K/UL (ref 130–400)
GLUCOSE SERPL-MCNC: 93 MG/DL (ref 70–99)
HCT VFR BLD CALC: 36.4 % (ref 37–47)
HGB BLD-MCNC: 12.7 G/DL (ref 12–16)
MCH RBC QN AUTO: 30.5 PG (ref 25–34)
MCHC RBC AUTO-ENTMCNC: 34.9 G/DL (ref 32–36)
MCV RBC AUTO: 87.5 FL (ref 80–100)
PHOSPHATE SERPL-MCNC: 3.1 MG/DL (ref 2.5–4.9)
PMV BLD AUTO: 9 FL (ref 7.4–10.4)
POTASSIUM SERPL-SCNC: 3.8 MMOL/L (ref 3.5–5.1)
RED CELL DISTRIBUTION WIDTH CV: 12.8 % (ref 11.5–14.5)
RED CELL DISTRIBUTION WIDTH SD: 40.9 FL (ref 36.4–46.3)
SODIUM SERPL-SCNC: 125 MMOL/L (ref 136–145)
T4 FREE SERPL-MCNC: 209 MOMS/KG (ref 500–800)
WBC # BLD AUTO: 6.45 K/UL (ref 4.8–10.8)